# Patient Record
Sex: FEMALE | Race: BLACK OR AFRICAN AMERICAN | NOT HISPANIC OR LATINO | Employment: FULL TIME | URBAN - METROPOLITAN AREA
[De-identification: names, ages, dates, MRNs, and addresses within clinical notes are randomized per-mention and may not be internally consistent; named-entity substitution may affect disease eponyms.]

---

## 2024-01-24 ENCOUNTER — HOSPITAL ENCOUNTER (EMERGENCY)
Facility: HOSPITAL | Age: 29
Discharge: HOME | End: 2024-01-24
Attending: EMERGENCY MEDICINE | Admitting: EMERGENCY MEDICINE

## 2024-01-24 VITALS
OXYGEN SATURATION: 100 % | WEIGHT: 137.35 LBS | HEIGHT: 65 IN | SYSTOLIC BLOOD PRESSURE: 103 MMHG | BODY MASS INDEX: 22.88 KG/M2 | HEART RATE: 87 BPM | RESPIRATION RATE: 17 BRPM | TEMPERATURE: 97.9 F | DIASTOLIC BLOOD PRESSURE: 54 MMHG

## 2024-01-24 DIAGNOSIS — Z33.1 PREGNANCY TEST POSITIVE FOR INCIDENTAL PREGNANCY: Primary | ICD-10-CM

## 2024-01-24 LAB
ALBUMIN SERPL-MCNC: 4 G/DL (ref 3.5–5.2)
ALBUMIN/GLOB SERPL: 1.5 G/DL
ALP SERPL-CCNC: 34 U/L (ref 39–117)
ALT SERPL W P-5'-P-CCNC: 63 U/L (ref 1–33)
ANION GAP SERPL CALCULATED.3IONS-SCNC: 10.9 MMOL/L (ref 5–15)
AST SERPL-CCNC: 31 U/L (ref 1–32)
B-HCG UR QL: POSITIVE
BASOPHILS # BLD AUTO: 0.02 10*3/MM3 (ref 0–0.2)
BASOPHILS NFR BLD AUTO: 0.2 % (ref 0–1.5)
BILIRUB SERPL-MCNC: 0.2 MG/DL (ref 0–1.2)
BILIRUB UR QL STRIP: NEGATIVE
BUN SERPL-MCNC: 4 MG/DL (ref 6–20)
BUN/CREAT SERPL: 8.2 (ref 7–25)
CALCIUM SPEC-SCNC: 8.3 MG/DL (ref 8.6–10.5)
CHLORIDE SERPL-SCNC: 104 MMOL/L (ref 98–107)
CLARITY UR: ABNORMAL
CO2 SERPL-SCNC: 19.1 MMOL/L (ref 22–29)
COLOR UR: YELLOW
CREAT SERPL-MCNC: 0.49 MG/DL (ref 0.57–1)
DEPRECATED RDW RBC AUTO: 38.9 FL (ref 37–54)
EGFRCR SERPLBLD CKD-EPI 2021: 132.7 ML/MIN/1.73
EOSINOPHIL # BLD AUTO: 0.13 10*3/MM3 (ref 0–0.4)
EOSINOPHIL NFR BLD AUTO: 1.5 % (ref 0.3–6.2)
ERYTHROCYTE [DISTWIDTH] IN BLOOD BY AUTOMATED COUNT: 13.1 % (ref 12.3–15.4)
GLOBULIN UR ELPH-MCNC: 2.6 GM/DL
GLUCOSE SERPL-MCNC: 78 MG/DL (ref 65–99)
GLUCOSE UR STRIP-MCNC: NEGATIVE MG/DL
HCG INTACT+B SERPL-ACNC: NORMAL MIU/ML
HCT VFR BLD AUTO: 33.3 % (ref 34–46.6)
HGB BLD-MCNC: 11.1 G/DL (ref 12–15.9)
HGB UR QL STRIP.AUTO: NEGATIVE
HOLD SPECIMEN: NORMAL
HOLD SPECIMEN: NORMAL
IMM GRANULOCYTES # BLD AUTO: 0.03 10*3/MM3 (ref 0–0.05)
IMM GRANULOCYTES NFR BLD AUTO: 0.3 % (ref 0–0.5)
KETONES UR QL STRIP: ABNORMAL
LEUKOCYTE ESTERASE UR QL STRIP.AUTO: NEGATIVE
LIPASE SERPL-CCNC: 12 U/L (ref 13–60)
LYMPHOCYTES # BLD AUTO: 3.18 10*3/MM3 (ref 0.7–3.1)
LYMPHOCYTES NFR BLD AUTO: 37 % (ref 19.6–45.3)
MCH RBC QN AUTO: 27.3 PG (ref 26.6–33)
MCHC RBC AUTO-ENTMCNC: 33.3 G/DL (ref 31.5–35.7)
MCV RBC AUTO: 82 FL (ref 79–97)
MONOCYTES # BLD AUTO: 0.51 10*3/MM3 (ref 0.1–0.9)
MONOCYTES NFR BLD AUTO: 5.9 % (ref 5–12)
NEUTROPHILS NFR BLD AUTO: 4.73 10*3/MM3 (ref 1.7–7)
NEUTROPHILS NFR BLD AUTO: 55.1 % (ref 42.7–76)
NITRITE UR QL STRIP: NEGATIVE
NRBC BLD AUTO-RTO: 0 /100 WBC (ref 0–0.2)
PH UR STRIP.AUTO: 6 [PH] (ref 5–8)
PLATELET # BLD AUTO: 180 10*3/MM3 (ref 140–450)
PMV BLD AUTO: 10.1 FL (ref 6–12)
POTASSIUM SERPL-SCNC: 3.8 MMOL/L (ref 3.5–5.2)
PROT SERPL-MCNC: 6.6 G/DL (ref 6–8.5)
PROT UR QL STRIP: NEGATIVE
RBC # BLD AUTO: 4.06 10*6/MM3 (ref 3.77–5.28)
SODIUM SERPL-SCNC: 134 MMOL/L (ref 136–145)
SP GR UR STRIP: 1.02 (ref 1–1.03)
UROBILINOGEN UR QL STRIP: ABNORMAL
WBC NRBC COR # BLD AUTO: 8.6 10*3/MM3 (ref 3.4–10.8)
WHOLE BLOOD HOLD COAG: NORMAL
WHOLE BLOOD HOLD SPECIMEN: NORMAL

## 2024-01-24 PROCEDURE — 36415 COLL VENOUS BLD VENIPUNCTURE: CPT

## 2024-01-24 PROCEDURE — 85025 COMPLETE CBC W/AUTO DIFF WBC: CPT

## 2024-01-24 PROCEDURE — 81003 URINALYSIS AUTO W/O SCOPE: CPT

## 2024-01-24 PROCEDURE — 80053 COMPREHEN METABOLIC PANEL: CPT

## 2024-01-24 PROCEDURE — 99283 EMERGENCY DEPT VISIT LOW MDM: CPT

## 2024-01-24 PROCEDURE — 25810000003 SODIUM CHLORIDE 0.9 % SOLUTION

## 2024-01-24 PROCEDURE — 81025 URINE PREGNANCY TEST: CPT

## 2024-01-24 PROCEDURE — 83690 ASSAY OF LIPASE: CPT

## 2024-01-24 PROCEDURE — 84702 CHORIONIC GONADOTROPIN TEST: CPT | Performed by: EMERGENCY MEDICINE

## 2024-01-24 RX ORDER — SODIUM CHLORIDE 0.9 % (FLUSH) 0.9 %
10 SYRINGE (ML) INJECTION AS NEEDED
Status: DISCONTINUED | OUTPATIENT
Start: 2024-01-24 | End: 2024-01-24 | Stop reason: HOSPADM

## 2024-01-24 RX ORDER — ACETAMINOPHEN 325 MG/1
650 TABLET ORAL ONCE
Status: COMPLETED | OUTPATIENT
Start: 2024-01-24 | End: 2024-01-24

## 2024-01-24 RX ADMIN — ACETAMINOPHEN 650 MG: 325 TABLET ORAL at 11:14

## 2024-01-24 RX ADMIN — SODIUM CHLORIDE 500 ML: 9 INJECTION, SOLUTION INTRAVENOUS at 11:05

## 2024-01-24 NOTE — Clinical Note
[facility] EMERGENCY ROOM  [address]  [place] [address]  Phone: [phone]    Juanito Newberry was seen and treated in our emergency department on 1/24/2024.  She may return to work on 01/25/2024.         Thank you for choosing [facility].    Stephanie Hurt PA-C

## 2024-01-24 NOTE — ED PROVIDER NOTES
"Time: 10:32 AM EST  Date of encounter:  1/24/2024  Independent Historian/Clinical History and Information was obtained by:   Patient    History is limited by: N/A    Chief Complaint   Patient presents with    Abdominal Pain         History of Present Illness:  Patient is a 28 y.o. year old female who presents to the emergency department for evaluation of periumbilical abdominal cramping and irregular menstrual cycles.  Patient states her LMP was about 2 to 3 months, she states she has always had irregular cycles.  Is not on birth control.  Last bowel movement yesterday.  Patient states she normally has cramping in her lower abdominal area when she is about to start her cycle but never in the middle of her abdomen.  Denies nausea, vomiting, diarrhea, dysuria and hematuria. Denies vaginal bleeding.     Patient Care Team  Primary Care Provider: Provider, No Known    Past Medical History:     No Known Allergies  History reviewed. No pertinent past medical history.  History reviewed. No pertinent surgical history.  History reviewed. No pertinent family history.    Home Medications:  Prior to Admission medications    Not on File        Social History:   Social History     Tobacco Use    Smoking status: Never    Smokeless tobacco: Never   Vaping Use    Vaping Use: Former   Substance Use Topics    Alcohol use: Yes     Comment: \"social drinker\"    Drug use: Not Currently     Types: Marijuana     Comment: last use 2 mo. ago         Review of Systems:  Review of Systems   Constitutional: Negative.    HENT: Negative.     Eyes: Negative.    Respiratory: Negative.     Cardiovascular: Negative.    Gastrointestinal:  Positive for abdominal pain. Negative for diarrhea, nausea and vomiting.   Endocrine: Negative.    Genitourinary: Negative.  Negative for dysuria and hematuria.   Musculoskeletal: Negative.    Skin: Negative.    Allergic/Immunologic: Negative.    Neurological: Negative.    Hematological: Negative.  " "  Psychiatric/Behavioral: Negative.          Physical Exam:  BP 94/61   Pulse 72   Temp 97.9 °F (36.6 °C) (Oral)   Resp 18   Ht 165.1 cm (65\")   Wt 62.3 kg (137 lb 5.6 oz)   LMP  (LMP Unknown) Comment: pt. think her last period was about 2 mo. hasn't been sexually active for 2 mo. doesn't know if she could be pregnant because she hasn't checked  SpO2 99%   BMI 22.86 kg/m²         Physical Exam  Vitals and nursing note reviewed.   Constitutional:       Appearance: Normal appearance.   HENT:      Head: Normocephalic and atraumatic.      Nose: Nose normal.      Mouth/Throat:      Mouth: Mucous membranes are moist.   Eyes:      Extraocular Movements: Extraocular movements intact.      Conjunctiva/sclera: Conjunctivae normal.      Pupils: Pupils are equal, round, and reactive to light.   Cardiovascular:      Rate and Rhythm: Normal rate and regular rhythm.      Heart sounds: Normal heart sounds.   Pulmonary:      Effort: Pulmonary effort is normal.      Breath sounds: Normal breath sounds.   Abdominal:      General: Abdomen is flat. Bowel sounds are normal.      Palpations: Abdomen is soft.      Tenderness: There is no abdominal tenderness. There is no right CVA tenderness, left CVA tenderness, guarding or rebound.   Musculoskeletal:         General: Normal range of motion.      Cervical back: Normal range of motion and neck supple.   Skin:     General: Skin is warm and dry.   Neurological:      General: No focal deficit present.      Mental Status: She is alert and oriented to person, place, and time.   Psychiatric:         Mood and Affect: Mood normal.         Behavior: Behavior normal.                  Procedures:  Procedures      Medical Decision Making:      Comorbidities that affect care:    None    External Notes reviewed:    None      The following orders were placed and all results were independently analyzed by me:  Orders Placed This Encounter   Procedures    [place] Draw    Comprehensive Metabolic " Panel    Lipase    Urinalysis With Microscopic If Indicated (No Culture) - Urine, Clean Catch    hCG, Quantitative, Pregnancy    CBC Auto Differential    Pregnancy, Urine - Urine, Clean Catch    Ambulatory Referral to Obstetrics / Gynecology    NPO Diet NPO Type: Strict NPO    Undress & Gown    Insert Peripheral IV    CBC & Differential    Green Top (Gel)    Lavender Top    Gold Top - SST    Light Blue Top       Medications Given in the Emergency Department:  Medications   sodium chloride 0.9 % flush 10 mL (has no administration in time range)   sodium chloride 0.9 % bolus 500 mL (500 mL Intravenous New Bag 1/24/24 1105)   acetaminophen (TYLENOL) tablet 650 mg (650 mg Oral Given 1/24/24 1114)        ED Course:    The patient was initially evaluated in the triage area where orders were placed. The patient was later dispositioned by Stephanie Hurt PA-C.      The patient was advised to stay for completion of workup which includes but is not limited to communication of labs and radiological results, reassessment and plan. The patient was advised that leaving prior to disposition by a provider could result in critical findings that are not communicated to the patient.          Labs:    Lab Results (last 24 hours)       Procedure Component Value Units Date/Time    Urinalysis With Microscopic If Indicated (No Culture) - Urine, Clean Catch [[id number]]  (Abnormal) Collected: 01/24/24 0927    Specimen: Urine, Clean Catch Updated: 01/24/24 0934     Color, UA Yellow     Appearance, UA Cloudy     pH, UA 6.0     Specific Gravity, UA 1.017     Glucose, UA Negative     Ketones, UA 15 mg/dL (1+)     Bilirubin, UA Negative     Blood, UA Negative     Protein, UA Negative     Leuk Esterase, UA Negative     Nitrite, UA Negative     Urobilinogen, UA 0.2 E.U./dL    Narrative:      Urine microscopic not indicated.    Pregnancy, Urine - Urine, Clean Catch [[id number]]  (Abnormal) Collected: 01/24/24 0927    Specimen: Urine, Clean Catch  Updated: 01/24/24 1042     HCG, Urine QL Positive    Narrative:      Sensitive immunoassays may demonstrate false positive results  with specimens containing heterophilic antibodies. Assays may  also exhibit false-positive or false-negative results with  specimens containing human anti-mouse antibodies. These   specimens may come from patients receving preparations of  mouse monoclonal antibodies for diagnosis or therapy or having  been exposed to mice. If the qualitative interpretation is  inconsistent with the clinical evaluation, results should be   confirmed by an alternate hCG method, ie. quantitative hCG.  As with all urine pregnancy test, this test does not reliably  detect hCG degradation products, including free-beta hCG and  beta core fragments.    CBC & Differential [[id number]]  (Abnormal) Collected: 01/24/24 0928    Specimen: Blood from Arm, Right Updated: 01/24/24 0932    Narrative:      The following orders were created for panel order CBC & Differential.  Procedure                               Abnormality         Status                     ---------                               -----------         ------                     CBC Auto Differential[[id number]]        Abnormal            Final result                 Please view results for these tests on the individual orders.    Comprehensive Metabolic Panel [[id number]]  (Abnormal) Collected: 01/24/24 0928    Specimen: Blood from Arm, Right Updated: 01/24/24 1005     Glucose 78 mg/dL      BUN 4 mg/dL      Creatinine 0.49 mg/dL      Sodium 134 mmol/L      Potassium 3.8 mmol/L      Chloride 104 mmol/L      CO2 19.1 mmol/L      Calcium 8.3 mg/dL      Total Protein 6.6 g/dL      Albumin 4.0 g/dL      ALT (SGPT) 63 U/L      AST (SGOT) 31 U/L      Alkaline Phosphatase 34 U/L      Total Bilirubin 0.2 mg/dL      Globulin 2.6 gm/dL      A/G Ratio 1.5 g/dL      BUN/Creatinine Ratio 8.2     Anion Gap 10.9 mmol/L      eGFR 132.7 mL/min/1.73     Narrative:      GFR  Normal >60  Chronic Kidney Disease <60  Kidney Failure <15      Lipase [[id number]]  (Abnormal) Collected: 01/24/24 0928    Specimen: Blood from Arm, Right Updated: 01/24/24 1005     Lipase 12 U/L     hCG, Quantitative, Pregnancy [[id number]] Collected: 01/24/24 0928    Specimen: Blood from Arm, Right Updated: 01/24/24 1120     HCG Quantitative 41,539.00 mIU/mL     Narrative:      HCG Ranges by Gestational Age    Females - non-pregnant premenopausal   </= 1mIU/mL HCG  Females - postmenopausal               </= 7mIU/mL HCG    3 Weeks       5.4   -      72 mIU/mL  4 Weeks      10.2   -     708 mIU/mL  5 Weeks       217   -   8,245 mIU/mL  6 Weeks       152   -  32,177 mIU/mL  7 Weeks     4,059   - 153,767 mIU/mL  8 Weeks    31,366   - 149,094 mIU/mL  9 Weeks    59,109   - 135,901 mIU/mL  10 Weeks   44,186   - 170,409 mIU/mL  12 Weeks   27,107   - 201,615 mIU/mL  14 Weeks   24,302   -  93,646 mIU/mL  15 Weeks   12,540   -  69,747 mIU/mL  16 Weeks    8,904   -  55,332 mIU/mL  17 Weeks    8,240   -  51,793 mIU/mL  18 Weeks    9,649   -  55,271 mIU/mL      CBC Auto Differential [[id number]]  (Abnormal) Collected: 01/24/24 0928    Specimen: Blood from Arm, Right Updated: 01/24/24 0932     WBC 8.60 10*3/mm3      RBC 4.06 10*6/mm3      Hemoglobin 11.1 g/dL      Hematocrit 33.3 %      MCV 82.0 fL      MCH 27.3 pg      MCHC 33.3 g/dL      RDW 13.1 %      RDW-SD 38.9 fl      MPV 10.1 fL      Platelets 180 10*3/mm3      Neutrophil % 55.1 %      Lymphocyte % 37.0 %      Monocyte % 5.9 %      Eosinophil % 1.5 %      Basophil % 0.2 %      Immature Grans % 0.3 %      Neutrophils, Absolute 4.73 10*3/mm3      Lymphocytes, Absolute 3.18 10*3/mm3      Monocytes, Absolute 0.51 10*3/mm3      Eosinophils, Absolute 0.13 10*3/mm3      Basophils, Absolute 0.02 10*3/mm3      Immature Grans, Absolute 0.03 10*3/mm3      nRBC 0.0 /100 WBC              Imaging:    No Radiology Exams Resulted Within Past 24 Hours      Differential Diagnosis and  Discussion:      Abdominal Pain: Based on the patient's signs and symptoms, I considered abdominal aortic aneurysm, small bowel obstruction, pancreatitis, acute cholecystitis, acute appendecitis, peptic ulcer disease, gastritis, colitis, endocrine disorders, irritable bowel syndrome and other differential diagnosis an etiology of the patient's abdominal pain.    All labs were reviewed and interpreted by me.  All X-rays impressions were independently interpreted by me.    MDM     Amount and/or Complexity of Data Reviewed  Clinical lab tests: reviewed                 Patient Care Considerations:    CT ABDOMEN AND PELVIS: I considered ordering a CT scan of the abdomen and pelvis however patient's pregnancy test was positive      Consultants/Shared Management Plan:    None    Social Determinants of Health:    Patient is independent, reliable, and has access to care.       Disposition and Care Coordination:    Discharged: The patient is suitable and stable for discharge with no need for consideration of admission.    I have explained the patient´s condition, diagnoses and treatment plan based on the information available to me at this time. I have answered questions and addressed any concerns. The patient has a good  understanding of the patient´s diagnosis, condition, and treatment plan as can be expected at this point. The vital signs have been stable. The patient´s condition is stable and appropriate for discharge from the emergency department.      The patient will pursue further outpatient evaluation with the primary care physician or other designated or consulting physician as outlined in the discharge instructions. They are agreeable to this plan of care and follow-up instructions have been explained in detail. The patient has received these instructions in written format and have expressed an understanding of the discharge instructions. The patient is aware that any significant change in condition or worsening of  symptoms should prompt an immediate return to this or the closest emergency department or call to 911.  I have explained discharge medications and the need for follow up with the patient/caretakers. This was also printed in the discharge instructions. Patient was discharged with the following medications and follow up:      Medication List      No changes were made to your prescriptions during this visit.      No follow-up provider specified.     Final diagnoses:   Pregnancy test positive for incidental pregnancy        ED Disposition       ED Disposition   Discharge    Condition   Stable    Comment   --               This medical record created using voice recognition software.             Stephanie Hurt PA-C  01/24/24 1134

## 2024-01-24 NOTE — DISCHARGE INSTRUCTIONS
Your urine and blood pregnancy test came back positive it puts you at around 7 to 8 weeks pregnant  You can start taking prenatals and I put in a referral for OB/GYN you should receive a phone call to schedule follow-up appointment  You can take Tylenol for abdominal pain  And a vaginal bleeding, pelvic pain please return to the ED

## 2024-01-31 ENCOUNTER — INITIAL PRENATAL (OUTPATIENT)
Dept: OBSTETRICS AND GYNECOLOGY | Facility: CLINIC | Age: 29
End: 2024-01-31
Payer: COMMERCIAL

## 2024-01-31 VITALS — WEIGHT: 123 LBS | SYSTOLIC BLOOD PRESSURE: 131 MMHG | BODY MASS INDEX: 20.47 KG/M2 | DIASTOLIC BLOOD PRESSURE: 81 MMHG

## 2024-01-31 DIAGNOSIS — Z34.00 SUPERVISION OF NORMAL FIRST PREGNANCY, ANTEPARTUM: Primary | ICD-10-CM

## 2024-01-31 LAB
ABO GROUP BLD: NORMAL
AMPHET+METHAMPHET UR QL: NEGATIVE
B-HCG UR QL: POSITIVE
BARBITURATES UR QL SCN: NEGATIVE
BASOPHILS # BLD AUTO: 0.02 10*3/MM3 (ref 0–0.2)
BASOPHILS NFR BLD AUTO: 0.3 % (ref 0–1.5)
BENZODIAZ UR QL SCN: NEGATIVE
BLD GP AB SCN SERPL QL: NEGATIVE
CANNABINOIDS SERPL QL: POSITIVE
COCAINE UR QL: NEGATIVE
DEPRECATED RDW RBC AUTO: 37.5 FL (ref 37–54)
EOSINOPHIL # BLD AUTO: 0.08 10*3/MM3 (ref 0–0.4)
EOSINOPHIL NFR BLD AUTO: 1.2 % (ref 0.3–6.2)
ERYTHROCYTE [DISTWIDTH] IN BLOOD BY AUTOMATED COUNT: 13 % (ref 12.3–15.4)
EXPIRATION DATE: ABNORMAL
FENTANYL UR-MCNC: NEGATIVE NG/ML
GLUCOSE UR STRIP-MCNC: NEGATIVE MG/DL
HBV SURFACE AG SERPL QL IA: NORMAL
HCT VFR BLD AUTO: 32.1 % (ref 34–46.6)
HCV AB SER DONR QL: NORMAL
HGB BLD-MCNC: 11.2 G/DL (ref 12–15.9)
IMM GRANULOCYTES # BLD AUTO: 0.02 10*3/MM3 (ref 0–0.05)
IMM GRANULOCYTES NFR BLD AUTO: 0.3 % (ref 0–0.5)
INTERNAL NEGATIVE CONTROL: NEGATIVE
INTERNAL POSITIVE CONTROL: ABNORMAL
LYMPHOCYTES # BLD AUTO: 2.44 10*3/MM3 (ref 0.7–3.1)
LYMPHOCYTES NFR BLD AUTO: 35.7 % (ref 19.6–45.3)
Lab: ABNORMAL
MCH RBC QN AUTO: 28.1 PG (ref 26.6–33)
MCHC RBC AUTO-ENTMCNC: 34.9 G/DL (ref 31.5–35.7)
MCV RBC AUTO: 80.5 FL (ref 79–97)
METHADONE UR QL SCN: NEGATIVE
MONOCYTES # BLD AUTO: 0.49 10*3/MM3 (ref 0.1–0.9)
MONOCYTES NFR BLD AUTO: 7.2 % (ref 5–12)
NEUTROPHILS NFR BLD AUTO: 3.78 10*3/MM3 (ref 1.7–7)
NEUTROPHILS NFR BLD AUTO: 55.3 % (ref 42.7–76)
NRBC BLD AUTO-RTO: 0.1 /100 WBC (ref 0–0.2)
OPIATES UR QL: NEGATIVE
OXYCODONE UR QL SCN: NEGATIVE
PLATELET # BLD AUTO: 188 10*3/MM3 (ref 140–450)
PMV BLD AUTO: 11 FL (ref 6–12)
PROT UR STRIP-MCNC: NEGATIVE MG/DL
RBC # BLD AUTO: 3.99 10*6/MM3 (ref 3.77–5.28)
RH BLD: NEGATIVE
T PALLIDUM IGG SER QL: NORMAL
WBC NRBC COR # BLD AUTO: 6.83 10*3/MM3 (ref 3.4–10.8)

## 2024-01-31 PROCEDURE — 87086 URINE CULTURE/COLONY COUNT: CPT | Performed by: NURSE PRACTITIONER

## 2024-01-31 PROCEDURE — 86901 BLOOD TYPING SEROLOGIC RH(D): CPT | Performed by: NURSE PRACTITIONER

## 2024-01-31 PROCEDURE — 80307 DRUG TEST PRSMV CHEM ANLYZR: CPT | Performed by: NURSE PRACTITIONER

## 2024-01-31 PROCEDURE — 86900 BLOOD TYPING SEROLOGIC ABO: CPT | Performed by: NURSE PRACTITIONER

## 2024-01-31 PROCEDURE — 86850 RBC ANTIBODY SCREEN: CPT | Performed by: NURSE PRACTITIONER

## 2024-01-31 PROCEDURE — 87340 HEPATITIS B SURFACE AG IA: CPT | Performed by: NURSE PRACTITIONER

## 2024-01-31 PROCEDURE — 85025 COMPLETE CBC W/AUTO DIFF WBC: CPT | Performed by: NURSE PRACTITIONER

## 2024-01-31 PROCEDURE — 86803 HEPATITIS C AB TEST: CPT | Performed by: NURSE PRACTITIONER

## 2024-01-31 PROCEDURE — 86780 TREPONEMA PALLIDUM: CPT | Performed by: NURSE PRACTITIONER

## 2024-01-31 NOTE — PROGRESS NOTES
OB Initial Visit    CC- Here for care of current pregnancy, first visit    Subjective:  28 y.o.  presenting for her first obstetrical visit.    LMP: No LMP recorded (lmp unknown). Patient is pregnant.     Pt has no complaints, is doing well    Is unsure when she last had a period, reports typically only has 3-4 cycles a year.  Last intercourse was approximately 10/25/23 and had a positive home pregnancy test around .     Reviewed and updated:  OBHx, GYNHx (STDs), PMHx, Medications, Allergies, PSHx, Social Hx, Preventative Hx (PAP), Hx of abuse/safe environment, Vaccine Hx including hx of chickenpox or vaccine, Genetic Hx (pt, FOB, both families).        Objective:  /81   Wt 55.8 kg (123 lb)   LMP  (LMP Unknown) Comment: pt. think her last period was about 2 mo. hasn't been sexually active for 2 mo. doesn't know if she could be pregnant because she hasn't checked  BMI 20.47 kg/m²      Urine pregnancy test is positive     General- NAD, alert and oriented, appropriate  Psych- Normal mood, good memory  Neck- No masses, no thyroid enlargement  CV- Regular rhythm, no murnurs  Resp- CTA to bases, no wheezes  Abdomen- Soft, non distended, non tender, no masses    Breast left- deferred  Breast right- deferred    External genitalia- Normal, no lesions  Urethra- Normal, no masses, non tender  Vagina- Normal, no discharge  Bladder- Normal, no masses, non tender  Cvx- Normal, no lesions, no discharge, no CMT  Uterus- Normal shape and consistency, non tender, consistent with approximately 16 weeks , FHR 150s .  Brief TAS shows viable IUP, +cardiac motion  Adnexa- Normal, no mass, non tender    Lymphatic- No palpable neck, axillary, or groin nodes  Ext- No edema, no cyanosis    Skin- No lesions, no rashes, no acanthosis nigricans    Assessment and Plan:  Unknown  Diagnoses and all orders for this visit:    1. Supervision of normal first pregnancy, antepartum (Primary)  Overview:  PHAM finalized: 24 per  estimated conception, dating scan ordered    Optional testing NIPS,CF/SMA,AFP:  NIPS pending, considering CF/SMA, AFP    COVID: Recommended 1/31/24  Flu: Recommended 1/31/24  Tdap:  RSV:    Rhogam:  28-32 weeks repeat TPA:  ? Desires Sterilization:    Anatomy US:  FU US:    PROBLEM LIST/PLAN:   Unsure dates - dating scan ordered        Orders:  -     POC Urinalysis Dipstick  -     OB Panel With HIV  -     Urine Culture - Urine, Urine, Clean Catch  -     IGP,CtNgTv,Apt HPV,rfx 16 / 18,45  -     Hemoglobinopathy Fractionation [place]  -     POC Pregnancy, Urine  -     Urine Drug Screen - Urine, Clean Catch  -     [id number] PLUS Core+SCA+ESS - Blood, Arm, Right  -     US Ob 14 + Weeks Single or First Gestation; Future        Genetic Screening:   Considering   CF  NIPS  AFP only    Vaccines:   Recommend FLU vaccine this season, R/B discussed  Recommend COVID vaccine, R/B discussed    Counseling:   Nutrition discussed, calories, activity/exercise in pregnancy  Discussed dietary restrictions/safety food preparation in pregnancy  Reviewed what to expect prenatal visits, office providers (female and male) and covering [facility]/Dr. Ashton  Appropriate trimester precautions provided, N/V, vag bleeding, cramping  Questions answered    Labs:   Prenatal labs, cultures, and PAP performed (prn), NIPS    Return in about 4 weeks (around 2/28/2024) for [facility] Office, OB follow up.      Isaac Lubin, APRN  01/31/2024    [facility] [address]  [facility]  [address] [place] [address]  Dept: [phone]  Dept Fax: [phone]  Loc: [phone]

## 2024-02-01 PROBLEM — O26.899 RH NEGATIVE, ANTEPARTUM: Status: ACTIVE | Noted: 2024-02-01

## 2024-02-01 PROBLEM — Z67.91 RH NEGATIVE, ANTEPARTUM: Status: ACTIVE | Noted: 2024-02-01

## 2024-02-01 LAB — BACTERIA SPEC AEROBE CULT: NO GROWTH

## 2024-02-02 LAB
HGB A MFR BLD ELPH: 96.8 % (ref 96.4–98.8)
HGB A2 MFR BLD ELPH: 3.2 % (ref 1.8–3.2)
HGB F MFR BLD ELPH: 0 % (ref 0–2)
HGB FRACT BLD-IMP: NORMAL
HGB S MFR BLD ELPH: 0 %
RUBV IGG SERPL IA-ACNC: 1.56 INDEX

## 2024-02-06 LAB
5P15 DELETION (CRI-DU-CHAT): NOT DETECTED
C TRACH RRNA CVX QL NAA+PROBE: NEGATIVE
CFDNA.FET/CFDNA.TOTAL SFR FETUS: NORMAL %
CITATION REF LAB TEST: NORMAL
CYTOLOGIST CVX/VAG CYTO: ABNORMAL
CYTOLOGY CVX/VAG DOC CYTO: ABNORMAL
CYTOLOGY CVX/VAG DOC THIN PREP: ABNORMAL
DX ICD CODE: ABNORMAL
DX ICD CODE: ABNORMAL
FET 13+18+21+X+Y ANEUP PLAS.CFDNA: NEGATIVE
FET 1P36 DEL RISK WBC.DNA+CFDNA QL: NOT DETECTED
FET 22Q11.2 DEL RISK WBC.DNA+CFDNA QL: NOT DETECTED
FET CHR 11Q23 DEL PLAS.CFDNA QL: NOT DETECTED
FET CHR 15Q11 DEL PLAS.CFDNA QL: NOT DETECTED
FET CHR 21 TS PLAS.CFDNA QL: NEGATIVE
FET CHR 4P16 DEL PLAS.CFDNA QL: NOT DETECTED
FET CHR 8Q24 DEL PLAS.CFDNA QL: NOT DETECTED
FET MS X RISK WBC.DNA+CFDNA QL: NOT DETECTED
FET SEX PLAS.CFDNA DOSAGE CFDNA: NORMAL
FET TS 13 RISK PLAS.CFDNA QL: NEGATIVE
FET TS 18 RISK WBC.DNA+CFDNA QL: NEGATIVE
FET X + Y ANEUP RISK PLAS.CFDNA SEQ-IMP: NOT DETECTED
GA EST FROM CONCEPTION DATE: NORMAL D
GESTATIONAL AGE > 9:: YES
HIV 1 & 2 AB SER-IMP: ABNORMAL
HPV GENOTYPE REFLEX: ABNORMAL
HPV I/H RISK 4 DNA CVX QL PROBE+SIG AMP: POSITIVE
LAB DIRECTOR NAME PROVIDER: NORMAL
LAB DIRECTOR NAME PROVIDER: NORMAL
LABORATORY COMMENT REPORT: NORMAL
LIMITATIONS OF THE TEST: NORMAL
N GONORRHOEA RRNA CVX QL NAA+PROBE: NEGATIVE
NEGATIVE PREDICTIVE VALUE: NORMAL
NOTE: NORMAL
OTHER STN SPEC: ABNORMAL
PATHOLOGIST CVX/VAG CYTO: ABNORMAL
PERFORMANCE CHARACTERISTICS: NORMAL
POSITIVE PREDICTIVE VALUE: NORMAL
RECOM F/U CVX/VAG CYTO: ABNORMAL
REF LAB TEST METHOD: NORMAL
STAT OF ADQ CVX/VAG CYTO-IMP: ABNORMAL
T VAGINALIS RRNA SPEC QL NAA+PROBE: NEGATIVE
TEST PERFORMANCE INFO SPEC: NORMAL
TRIOSOMY 16: NOT DETECTED
TRISOMY 22: NOT DETECTED

## 2024-02-07 ENCOUNTER — TELEPHONE (OUTPATIENT)
Dept: OBSTETRICS AND GYNECOLOGY | Facility: CLINIC | Age: 29
End: 2024-02-07
Payer: COMMERCIAL

## 2024-02-07 NOTE — TELEPHONE ENCOUNTER
Patient called went over pap smear results ascus + hpv no additional testing at this time will repeat after delivery.

## 2024-02-14 ENCOUNTER — TELEPHONE (OUTPATIENT)
Dept: OBSTETRICS AND GYNECOLOGY | Facility: CLINIC | Age: 29
End: 2024-02-14
Payer: COMMERCIAL

## 2024-02-14 ENCOUNTER — HOSPITAL ENCOUNTER (OUTPATIENT)
Dept: ULTRASOUND IMAGING | Facility: HOSPITAL | Age: 29
Discharge: HOME | End: 2024-02-14
Admitting: NURSE PRACTITIONER
Payer: COMMERCIAL

## 2024-02-14 DIAGNOSIS — Z34.00 SUPERVISION OF NORMAL FIRST PREGNANCY, ANTEPARTUM: ICD-10-CM

## 2024-02-14 DIAGNOSIS — Z34.00 SUPERVISION OF NORMAL FIRST PREGNANCY, ANTEPARTUM: Primary | ICD-10-CM

## 2024-02-14 PROCEDURE — 76805 OB US >/= 14 WKS SNGL FETUS: CPT

## 2024-02-22 ENCOUNTER — REFERRAL TRIAGE (OUTPATIENT)
Dept: LABOR AND DELIVERY | Facility: HOSPITAL | Age: 29
End: 2024-02-22
Payer: COMMERCIAL

## 2024-02-28 ENCOUNTER — HOSPITAL ENCOUNTER (OUTPATIENT)
Dept: ULTRASOUND IMAGING | Facility: HOSPITAL | Age: 29
Discharge: HOME | End: 2024-02-28
Admitting: NURSE PRACTITIONER
Payer: COMMERCIAL

## 2024-02-28 DIAGNOSIS — Z34.00 SUPERVISION OF NORMAL FIRST PREGNANCY, ANTEPARTUM: ICD-10-CM

## 2024-02-28 PROCEDURE — 76811 OB US DETAILED SNGL FETUS: CPT

## 2024-02-29 DIAGNOSIS — Z34.00 SUPERVISION OF NORMAL FIRST PREGNANCY, ANTEPARTUM: Primary | ICD-10-CM

## 2024-03-13 ENCOUNTER — ROUTINE PRENATAL (OUTPATIENT)
Dept: OBSTETRICS AND GYNECOLOGY | Facility: CLINIC | Age: 29
End: 2024-03-13
Payer: COMMERCIAL

## 2024-03-13 ENCOUNTER — HOSPITAL ENCOUNTER (EMERGENCY)
Facility: HOSPITAL | Age: 29
Discharge: HOME | End: 2024-03-13
Attending: EMERGENCY MEDICINE | Admitting: EMERGENCY MEDICINE
Payer: COMMERCIAL

## 2024-03-13 ENCOUNTER — PATIENT OUTREACH (OUTPATIENT)
Dept: LABOR AND DELIVERY | Facility: HOSPITAL | Age: 29
End: 2024-03-13
Payer: COMMERCIAL

## 2024-03-13 ENCOUNTER — APPOINTMENT (OUTPATIENT)
Dept: GENERAL RADIOLOGY | Facility: HOSPITAL | Age: 29
End: 2024-03-13
Payer: COMMERCIAL

## 2024-03-13 VITALS — BODY MASS INDEX: 22.47 KG/M2 | WEIGHT: 135 LBS | SYSTOLIC BLOOD PRESSURE: 126 MMHG | DIASTOLIC BLOOD PRESSURE: 76 MMHG

## 2024-03-13 VITALS
BODY MASS INDEX: 23.52 KG/M2 | RESPIRATION RATE: 16 BRPM | SYSTOLIC BLOOD PRESSURE: 103 MMHG | HEART RATE: 115 BPM | OXYGEN SATURATION: 99 % | TEMPERATURE: 100.2 F | DIASTOLIC BLOOD PRESSURE: 67 MMHG | WEIGHT: 141.31 LBS

## 2024-03-13 DIAGNOSIS — O98.512 COVID-19 AFFECTING PREGNANCY IN SECOND TRIMESTER: ICD-10-CM

## 2024-03-13 DIAGNOSIS — Z34.00 SUPERVISION OF NORMAL FIRST PREGNANCY, ANTEPARTUM: Primary | ICD-10-CM

## 2024-03-13 DIAGNOSIS — Z67.91 RH NEGATIVE, ANTEPARTUM: ICD-10-CM

## 2024-03-13 DIAGNOSIS — O26.899 RH NEGATIVE, ANTEPARTUM: ICD-10-CM

## 2024-03-13 DIAGNOSIS — U07.1 COVID-19 AFFECTING PREGNANCY IN SECOND TRIMESTER: ICD-10-CM

## 2024-03-13 DIAGNOSIS — Z34.92 SECOND TRIMESTER PREGNANCY: Primary | ICD-10-CM

## 2024-03-13 DIAGNOSIS — J11.1 INFLUENZA: ICD-10-CM

## 2024-03-13 LAB
ALBUMIN SERPL-MCNC: 3.5 G/DL (ref 3.5–5.2)
ALBUMIN/GLOB SERPL: 1.3 G/DL
ALP SERPL-CCNC: 45 U/L (ref 39–117)
ALT SERPL W P-5'-P-CCNC: 33 U/L (ref 1–33)
ANION GAP SERPL CALCULATED.3IONS-SCNC: 11.2 MMOL/L (ref 5–15)
ARTERIAL PATENCY WRIST A: POSITIVE
AST SERPL-CCNC: 26 U/L (ref 1–32)
BASE EXCESS BLDA CALC-SCNC: -3.7 MMOL/L (ref -2–2)
BASOPHILS # BLD AUTO: 0.02 10*3/MM3 (ref 0–0.2)
BASOPHILS NFR BLD AUTO: 0.3 % (ref 0–1.5)
BDY SITE: ABNORMAL
BILIRUB SERPL-MCNC: 0.2 MG/DL (ref 0–1.2)
BUN SERPL-MCNC: 3 MG/DL (ref 6–20)
BUN/CREAT SERPL: 6.3 (ref 7–25)
CA-I BLDA-SCNC: 1.1 MMOL/L (ref 1.13–1.32)
CALCIUM SPEC-SCNC: 8 MG/DL (ref 8.6–10.5)
CHLORIDE BLDA-SCNC: 105 MMOL/L (ref 98–106)
CHLORIDE SERPL-SCNC: 101 MMOL/L (ref 98–107)
CO2 SERPL-SCNC: 16.8 MMOL/L (ref 22–29)
COHGB MFR BLD: 0.3 % (ref 0–1.5)
CREAT SERPL-MCNC: 0.48 MG/DL (ref 0.57–1)
D DIMER PPP FEU-MCNC: 1.97 MCGFEU/ML (ref 0–0.5)
DEPRECATED RDW RBC AUTO: 42.5 FL (ref 37–54)
EGFRCR SERPLBLD CKD-EPI 2021: 133.3 ML/MIN/1.73
EOSINOPHIL # BLD AUTO: 0.01 10*3/MM3 (ref 0–0.4)
EOSINOPHIL NFR BLD AUTO: 0.1 % (ref 0.3–6.2)
ERYTHROCYTE [DISTWIDTH] IN BLOOD BY AUTOMATED COUNT: 13.8 % (ref 12.3–15.4)
FHHB: 3 % (ref 0–5)
FLUAV SUBTYP SPEC NAA+PROBE: DETECTED
FLUBV RNA ISLT QL NAA+PROBE: NOT DETECTED
GLOBULIN UR ELPH-MCNC: 2.6 GM/DL
GLUCOSE BLDA-MCNC: 86 MG/DL (ref 65–99)
GLUCOSE SERPL-MCNC: 91 MG/DL (ref 65–99)
HCO3 BLDA-SCNC: 18.4 MMOL/L (ref 22–26)
HCT VFR BLD AUTO: 30.3 % (ref 34–46.6)
HGB BLD-MCNC: 10.2 G/DL (ref 12–15.9)
HGB BLDA-MCNC: 11.3 G/DL (ref 11.7–14.6)
HOLD SPECIMEN: NORMAL
HOLD SPECIMEN: NORMAL
IMM GRANULOCYTES # BLD AUTO: 0.05 10*3/MM3 (ref 0–0.05)
IMM GRANULOCYTES NFR BLD AUTO: 0.7 % (ref 0–0.5)
INHALED O2 CONCENTRATION: 21 %
LACTATE BLDA-SCNC: 0.74 MMOL/L (ref 0.5–2)
LYMPHOCYTES # BLD AUTO: 0.66 10*3/MM3 (ref 0.7–3.1)
LYMPHOCYTES NFR BLD AUTO: 9.2 % (ref 19.6–45.3)
MCH RBC QN AUTO: 28 PG (ref 26.6–33)
MCHC RBC AUTO-ENTMCNC: 33.7 G/DL (ref 31.5–35.7)
MCV RBC AUTO: 83.2 FL (ref 79–97)
METHGB BLD QL: 0.2 % (ref 0–1.5)
MODALITY: ABNORMAL
MONOCYTES # BLD AUTO: 0.58 10*3/MM3 (ref 0.1–0.9)
MONOCYTES NFR BLD AUTO: 8.1 % (ref 5–12)
NEUTROPHILS NFR BLD AUTO: 5.87 10*3/MM3 (ref 1.7–7)
NEUTROPHILS NFR BLD AUTO: 81.6 % (ref 42.7–76)
NOTE: ABNORMAL
NRBC BLD AUTO-RTO: 0 /100 WBC (ref 0–0.2)
NT-PROBNP SERPL-MCNC: 68.2 PG/ML (ref 0–450)
OXYHGB MFR BLDV: 96.5 % (ref 94–99)
PCO2 BLDA: 25.1 MM HG (ref 35–45)
PH BLDA: 7.48 PH UNITS (ref 7.35–7.45)
PLATELET # BLD AUTO: 177 10*3/MM3 (ref 140–450)
PMV BLD AUTO: 10.3 FL (ref 6–12)
PO2 BLD: 452 MM[HG] (ref 0–500)
PO2 BLDA: 95 MM HG (ref 80–100)
POTASSIUM BLDA-SCNC: 3.47 MMOL/L (ref 3.5–5)
POTASSIUM SERPL-SCNC: 3.6 MMOL/L (ref 3.5–5.2)
PROT SERPL-MCNC: 6.1 G/DL (ref 6–8.5)
QT INTERVAL: 301 MS
QTC INTERVAL: 436 MS
RBC # BLD AUTO: 3.64 10*6/MM3 (ref 3.77–5.28)
RSV RNA NPH QL NAA+NON-PROBE: NOT DETECTED
S PYO AG THROAT QL: NEGATIVE
SAO2 % BLDCOA: 97 % (ref 95–99)
SARS-COV-2 RNA RESP QL NAA+PROBE: NOT DETECTED
SODIUM BLDA-SCNC: 130.5 MMOL/L (ref 136–146)
SODIUM SERPL-SCNC: 129 MMOL/L (ref 136–145)
TROPONIN T SERPL HS-MCNC: <6 NG/L
WBC NRBC COR # BLD AUTO: 7.19 10*3/MM3 (ref 3.4–10.8)
WHOLE BLOOD HOLD COAG: NORMAL
WHOLE BLOOD HOLD SPECIMEN: NORMAL

## 2024-03-13 PROCEDURE — 84484 ASSAY OF TROPONIN QUANT: CPT

## 2024-03-13 PROCEDURE — 82805 BLOOD GASES W/O2 SATURATION: CPT | Performed by: EMERGENCY MEDICINE

## 2024-03-13 PROCEDURE — 85025 COMPLETE CBC W/AUTO DIFF WBC: CPT

## 2024-03-13 PROCEDURE — 99284 EMERGENCY DEPT VISIT MOD MDM: CPT

## 2024-03-13 PROCEDURE — 80053 COMPREHEN METABOLIC PANEL: CPT

## 2024-03-13 PROCEDURE — 93005 ELECTROCARDIOGRAM TRACING: CPT

## 2024-03-13 PROCEDURE — 87880 STREP A ASSAY W/OPTIC: CPT | Performed by: EMERGENCY MEDICINE

## 2024-03-13 PROCEDURE — 82375 ASSAY CARBOXYHB QUANT: CPT | Performed by: EMERGENCY MEDICINE

## 2024-03-13 PROCEDURE — 25810000003 SODIUM CHLORIDE 0.9 % SOLUTION: Performed by: EMERGENCY MEDICINE

## 2024-03-13 PROCEDURE — 87081 CULTURE SCREEN ONLY: CPT | Performed by: EMERGENCY MEDICINE

## 2024-03-13 PROCEDURE — 83050 HGB METHEMOGLOBIN QUAN: CPT | Performed by: EMERGENCY MEDICINE

## 2024-03-13 PROCEDURE — 87637 SARSCOV2&INF A&B&RSV AMP PRB: CPT | Performed by: EMERGENCY MEDICINE

## 2024-03-13 PROCEDURE — 85379 FIBRIN DEGRADATION QUANT: CPT | Performed by: EMERGENCY MEDICINE

## 2024-03-13 PROCEDURE — 83880 ASSAY OF NATRIURETIC PEPTIDE: CPT

## 2024-03-13 PROCEDURE — 93005 ELECTROCARDIOGRAM TRACING: CPT | Performed by: EMERGENCY MEDICINE

## 2024-03-13 PROCEDURE — 71045 X-RAY EXAM CHEST 1 VIEW: CPT

## 2024-03-13 RX ORDER — ASPIRIN 81 MG/1
81 TABLET ORAL DAILY
Qty: 90 TABLET | Refills: 3 | Status: SHIPPED | OUTPATIENT
Start: 2024-03-13

## 2024-03-13 RX ORDER — SODIUM CHLORIDE 0.9 % (FLUSH) 0.9 %
10 SYRINGE (ML) INJECTION AS NEEDED
Status: DISCONTINUED | OUTPATIENT
Start: 2024-03-13 | End: 2024-03-14 | Stop reason: HOSPADM

## 2024-03-13 RX ORDER — OSELTAMIVIR PHOSPHATE 75 MG/1
75 CAPSULE ORAL 2 TIMES DAILY
Qty: 10 CAPSULE | Refills: 0 | Status: SHIPPED | OUTPATIENT
Start: 2024-03-13

## 2024-03-13 RX ORDER — ACETAMINOPHEN 325 MG/1
975 TABLET ORAL ONCE
Status: COMPLETED | OUTPATIENT
Start: 2024-03-13 | End: 2024-03-13

## 2024-03-13 RX ADMIN — SODIUM CHLORIDE 500 ML: 9 INJECTION, SOLUTION INTRAVENOUS at 20:53

## 2024-03-13 RX ADMIN — ACETAMINOPHEN 975 MG: 325 TABLET ORAL at 20:52

## 2024-03-13 NOTE — PROGRESS NOTES
OB FOLLOW UP    CC: Scheduled OB routine FU     Prenatal care complicated by:   Patient Active Problem List   Diagnosis    Supervision of normal first pregnancy, antepartum    Rh negative, antepartum    COVID-19 affecting pregnancy in second trimester       Subjective:   Patient has: No leaking fluid, No vaginal bleeding, No contractions, Adequate FM  Reports she was diagnosed with COVID about a week ago.  Feeling much better now but still having some nasal congestion.  Also concerned about multiple nosebleeds particularly at work.    Objective:  Urine glucose/protein- see flow sheet      /76   Wt 61.2 kg (135 lb)   LMP  (LMP Unknown) Comment: pt. think her last period was about 2 mo. hasn't been sexually active for 2 mo. doesn't know if she could be pregnant because she hasn't checked  BMI 22.47 kg/m²   See OB flow for LE edema, and cvx exam if applicable  FHT: 161 BPM   Uterine Size:  22 cm      Assessment and Plan:  Diagnoses and all orders for this visit:    1. Supervision of normal first pregnancy, antepartum (Primary)  Overview:  PHAM finalized: 7/17/24 per estimated conception, confirmed by 18-week US    Optional testing NIPS,CF/SMA,AFP:  NIPS pending, considering CF/SMA, AFP    COVID: Recommended 1/31/24  Flu: Recommended 1/31/24  Tdap:    Anatomy US: 2/28/24 normal anatomy except for limited views of cranial structures due to positioning, posterior placenta,  g, 76%, AC 53%.  Follow up ordered     Assessment & Plan:  Reviewed the patient's dating and anatomy ultrasound.  EDC finalized.  Continue prenatal vitamin  Follow-up anatomy ultrasound ordered    Orders:  -     Cancel: POC Urinalysis Dipstick    2. Rh negative, antepartum  Overview:  Plan RhoGAM at 28 weeks      3. COVID-19 affecting pregnancy in second trimester  Assessment & Plan:  Nosebleeds likely due to increased nasal congestion.  Recommended a humidifier while sleeping and frequent use of saline nasal spray.    Orders:  -      aspirin 81 MG EC tablet; Take 1 tablet by mouth Daily.  Dispense: 90 tablet; Refill: 3          22w0d  Reassuring pregnancy progress    Counseling: OB precautions, leaking, VB, nina coker vs PTL/Labor  [facility]    Questions answered    Return in about 4 weeks (around 4/10/2024) for Recheck.      Lee Solorzano MD  03/13/2024

## 2024-03-13 NOTE — ASSESSMENT & PLAN NOTE
Nosebleeds likely due to increased nasal congestion.  Recommended a humidifier while sleeping and frequent use of saline nasal spray.

## 2024-03-13 NOTE — ASSESSMENT & PLAN NOTE
Reviewed the patient's dating and anatomy ultrasound.  EDC finalized.  Continue prenatal vitamin  Follow-up anatomy ultrasound ordered

## 2024-03-13 NOTE — OUTREACH NOTE
[facility]  Enrollment    Current Estimated Gestational Age: 22w0d    Questions/Answers      Flowsheet Row Responses   Would like to participate? Yes          Intake Assessment      Flowsheet Row Responses   Best Method for Contacting Cell   Currently Employed Yes  [[employer]]   Able to keep appointments as scheduled Yes   Gender(s) and Name(s) Girl - Leonardia   Resources Presently Utilizing: None   Maternal Warning Signs Provided   Other: Provided   Other Education HANDS, WIC Benefits, Insurance benefits/Incentives            Pediatrician:   FOB: not involved - sister is support ([place])  Feeding Plan: breast feeding, discussed ordering through insurance or getting from hospital    No current concerns with food, housing or transportation.  Encouraged to reach out for any questions, needs or concerns.      Tobacco, Alcohol, and Drug History     reports that she has never smoked. She has been exposed to tobacco smoke. She has never used smokeless tobacco.   reports that she does not currently use alcohol.   reports that she does not currently use drugs after having used the following drugs: Marijuana.    Carmen Chu RN  Maternity Nurse Navigator    3/13/2024, 13:40 EDT

## 2024-03-14 NOTE — DISCHARGE INSTRUCTIONS
Drink plenty of fluids.  Take Tylenol as needed for fever.  Take Tamiflu as directed.  Turn for worsening symptoms.  Follow-up with your OB/GYN this week.

## 2024-03-14 NOTE — ED PROVIDER NOTES
Time: 8:23 PM EDT  Date of encounter:  3/13/2024  Independent Historian/Clinical History and Information was obtained by:   Patient    History is limited by: N/A    Chief Complaint: Cough, fever, back pain, shortness of breath.      History of Present Illness:  Patient is a 28 y.o. year old female who presents to the emergency department for evaluation of cough, fever, back pain, shortness of breath.  This patient presents to the emergency department send that she is approximate 5 and half weeks pregnant.  She states that she was diagnosed with COVID a couple of weeks ago and today presents with cough, weakness, shortness of breath and low-grade fever.  The cough is primarily nonproductive.  The patient denies any abdominal pain he denies any vaginal bleeding or discharge.  She has had excellent fetal movement.    HPI    Patient Care Team  Primary Care Provider: Provider, Gabby Known    Past Medical History:     No Known Allergies  History reviewed. No pertinent past medical history.  History reviewed. No pertinent surgical history.  Family History   Problem Relation Age of Onset    Diabetes Paternal Grandmother     Depression Neg Hx     COPD Neg Hx     Cancer Neg Hx     Bleeding Disorder Neg Hx     Birth defects Neg Hx     Asthma Neg Hx     Arthritis Neg Hx     Alcohol abuse Neg Hx     Drug abuse Neg Hx     Early death Neg Hx     Hearing loss Neg Hx     Heart disease Neg Hx     Hyperlipidemia Neg Hx     Hypertension Neg Hx     Kidney disease Neg Hx     Learning disabilities Neg Hx     Malig Hyperthermia Neg Hx     Mental illness Neg Hx     Mental retardation Neg Hx     Miscarriages / Stillbirths Neg Hx        Home Medications:  Prior to Admission medications    Medication Sig Start Date End Date Taking? Authorizing Provider   aspirin 81 MG EC tablet Take 1 tablet by mouth Daily. 3/13/24   Lee Solorzano MD   prenatal vitamin (prenatal, CLASSIC, vitamin) tablet Take  by mouth Daily.    Provider, MD Yemi  "       Social History:   Social History     Tobacco Use    Smoking status: Never     Passive exposure: Current    Smokeless tobacco: Never   Vaping Use    Vaping status: Former   Substance Use Topics    Alcohol use: Not Currently     Comment: \"social drinker\"    Drug use: Not Currently     Types: Marijuana     Comment: last use 2 mo. ago         Review of Systems:  Review of Systems   Constitutional:  Negative for chills and fever.   HENT:  Negative for congestion, ear pain and sore throat.    Eyes:  Negative for pain.   Respiratory:  Positive for cough, chest tightness and shortness of breath.    Cardiovascular:  Negative for chest pain.   Gastrointestinal:  Negative for abdominal pain, diarrhea, nausea and vomiting.   Genitourinary:  Negative for flank pain and hematuria.   Musculoskeletal:  Negative for joint swelling.   Skin:  Negative for pallor.   Neurological:  Negative for seizures and headaches.   All other systems reviewed and are negative.       Physical Exam:  /67   Pulse 115   Temp 100.2 °F (37.9 °C)   Resp 16   Wt 64.1 kg (141 lb 5 oz)   LMP  (LMP Unknown) Comment: pt. think her last period was about 2 mo. hasn't been sexually active for 2 mo. doesn't know if she could be pregnant because she hasn't checked  SpO2 99%   BMI 23.52 kg/m²     Physical Exam  Vitals and nursing note reviewed.   Constitutional:       General: She is not in acute distress.     Appearance: Normal appearance. She is not toxic-appearing.   HENT:      Head: Normocephalic and atraumatic.      Mouth/Throat:      Mouth: Mucous membranes are moist.   Eyes:      General: No scleral icterus.  Cardiovascular:      Rate and Rhythm: Regular rhythm. Tachycardia present.      Pulses: Normal pulses.      Heart sounds: Normal heart sounds.   Pulmonary:      Effort: Pulmonary effort is normal. No respiratory distress.      Breath sounds: Normal breath sounds.   Abdominal:      General: Abdomen is flat.      Palpations: Abdomen is " soft.      Tenderness: There is no abdominal tenderness.   Musculoskeletal:         General: Normal range of motion.      Cervical back: Normal range of motion and neck supple.   Skin:     General: Skin is warm and dry.   Neurological:      Mental Status: She is alert and oriented to person, place, and time. Mental status is at baseline.                  Procedures:  Procedures      Medical Decision Making:      Comorbidities that affect care:    Pregnancy    External Notes reviewed:    Previous Clinic Note: Diagnosis of COVID at urgent care on March 3.      The following orders were placed and all results were independently analyzed by me:  Orders Placed This Encounter   Procedures    COVID-19, FLU A/B, RSV PCR 1 HR TAT - Swab, Nasopharynx    Rapid Strep A Screen - Swab, Throat    Beta Strep Culture, Throat - Swab, Throat    XR Chest 1 View    [place] Draw    Comprehensive Metabolic Panel    BNP    Single High Sensitivity Troponin T    CBC Auto Differential    ABG with Co-Ox and Electrolytes    D-dimer, Quantitative    Undress & Gown    Continuous Pulse Oximetry    Vital Signs    Monitor fetal heart tones    ECG 12 Lead ED Triage Standing Order; SOA    CBC & Differential    Green Top (Gel)    Lavender Top    Gold Top - SST    Light Blue Top       Medications Given in the Emergency Department:  Medications   acetaminophen (TYLENOL) tablet 975 mg (975 mg Oral Given 3/13/24 2052)   sodium chloride 0.9 % bolus 500 mL (0 mL Intravenous Stopped 3/13/24 2146)        ED Course:    ED Course as of 03/14/24 0323   Wed Mar 13, 2024   2052 Ionized Calcium(!): 1.10 [PP]      ED Course User Index  [PP] Mayito Mccall,      EKG: Sinus tachycardia the rate of 126 bpm  No acute ischemic changes.    Labs:    Lab Results (last 24 hours)       Procedure Component Value Units Date/Time    CBC & Differential [[id number]]  (Abnormal) Collected: 03/13/24 1749    Specimen: Blood Updated: 03/13/24 1757    Narrative:      The following  orders were created for panel order CBC & Differential.  Procedure                               Abnormality         Status                     ---------                               -----------         ------                     CBC Auto Differential[[id number]]        Abnormal            Final result                 Please view results for these tests on the individual orders.    Comprehensive Metabolic Panel [[id number]]  (Abnormal) Collected: 03/13/24 1749    Specimen: Blood Updated: 03/13/24 1822     Glucose 91 mg/dL      BUN 3 mg/dL      Creatinine 0.48 mg/dL      Sodium 129 mmol/L      Potassium 3.6 mmol/L      Chloride 101 mmol/L      CO2 16.8 mmol/L      Calcium 8.0 mg/dL      Total Protein 6.1 g/dL      Albumin 3.5 g/dL      ALT (SGPT) 33 U/L      AST (SGOT) 26 U/L      Alkaline Phosphatase 45 U/L      Total Bilirubin 0.2 mg/dL      Globulin 2.6 gm/dL      A/G Ratio 1.3 g/dL      BUN/Creatinine Ratio 6.3     Anion Gap 11.2 mmol/L      eGFR 133.3 mL/min/1.73     Narrative:      GFR Normal >60  Chronic Kidney Disease <60  Kidney Failure <15      BNP [[id number]]  (Normal) Collected: 03/13/24 1749    Specimen: Blood Updated: 03/13/24 1820     proBNP 68.2 pg/mL     Narrative:      This assay is used as an aid in the diagnosis of individuals suspected of having heart failure. It can be used as an aid in the diagnosis of acute decompensated heart failure (ADHF) in patients presenting with signs and symptoms of ADHF to the emergency department (ED). In addition, NT-proBNP of <300 pg/mL indicates ADHF is not likely.    Age Range Result Interpretation  NT-proBNP Concentration (pg/mL:      <50             Positive            >450                   Gray                 300-450                    Negative             <300    50-75           Positive            >900                  Gray                300-900                  Negative            <300      >75             Positive            >1800                  Dowd                 300-1800                  Negative            <300    Single High Sensitivity Troponin T [[id number]]  (Normal) Collected: 03/13/24 1749    Specimen: Blood Updated: 03/13/24 1822     HS Troponin T <6 ng/L     Narrative:      High Sensitive Troponin T Reference Range:  <14.0 ng/L- Negative Female for AMI  <22.0 ng/L- Negative Male for AMI  >=14 - Abnormal Female indicating possible myocardial injury.  >=22 - Abnormal Male indicating possible myocardial injury.   Clinicians would have to utilize clinical acumen, EKG, Troponin, and serial changes to determine if it is an Acute Myocardial Infarction or myocardial injury due to an underlying chronic condition.         CBC Auto Differential [[id number]]  (Abnormal) Collected: 03/13/24 1749    Specimen: Blood Updated: 03/13/24 1757     WBC 7.19 10*3/mm3      RBC 3.64 10*6/mm3      Hemoglobin 10.2 g/dL      Hematocrit 30.3 %      MCV 83.2 fL      MCH 28.0 pg      MCHC 33.7 g/dL      RDW 13.8 %      RDW-SD 42.5 fl      MPV 10.3 fL      Platelets 177 10*3/mm3      Neutrophil % 81.6 %      Lymphocyte % 9.2 %      Monocyte % 8.1 %      Eosinophil % 0.1 %      Basophil % 0.3 %      Immature Grans % 0.7 %      Neutrophils, Absolute 5.87 10*3/mm3      Lymphocytes, Absolute 0.66 10*3/mm3      Monocytes, Absolute 0.58 10*3/mm3      Eosinophils, Absolute 0.01 10*3/mm3      Basophils, Absolute 0.02 10*3/mm3      Immature Grans, Absolute 0.05 10*3/mm3      nRBC 0.0 /100 WBC     ABG with Co-Ox and Electrolytes [[id number]]  (Abnormal) Collected: 03/13/24 2033    Specimen: Arterial Blood Updated: 03/13/24 2035     pH, Arterial 7.483 pH units      pCO2, Arterial 25.1 mm Hg      pO2, Arterial 95.0 mm Hg      HCO3, Arterial 18.4 mmol/L      Base Excess, Arterial -3.7 mmol/L      O2 Saturation, Arterial 97.0 %      Hemoglobin, Blood Gas 11.3 g/dL      Carboxyhemoglobin 0.3 %      Methemoglobin 0.20 %      Oxyhemoglobin 96.5 %      FHHB 3.0 %      Raimundo's Test Positive     Note  patient not hypoxic nor having trouble picking up O2 saturation. VBG applicable over ABG since patient has line present.     Site Arterial: right radial     Modality Room Air     FIO2 21 %      Sodium, Arterial 130.5 mmol/L      Potassium, Arterial 3.47 mmol/L      Ionized Calcium, Arterial 1.10 mmol/L      Chloride, Arterial 105 mmol/L      Glucose, Arterial 86 mg/dL      Lactate, Arterial 0.74 mmol/L      PO2/FIO2 452    COVID-19, FLU A/B, RSV PCR 1 HR TAT - Swab, Nasopharynx [[id number]]  (Abnormal) Collected: 03/13/24 2101    Specimen: Swab from Nasopharynx Updated: 03/13/24 2142     COVID19 Not Detected     Influenza A PCR Detected     Influenza B PCR Not Detected     RSV, PCR Not Detected    Narrative:      Fact sheet for providers: https://www.fda.gov/media/495602/download    Fact sheet for patients: https://www.fda.gov/media/586419/download    Test performed by PCR.    Rapid Strep A Screen - Swab, Throat [[id number]]  (Normal) Collected: 03/13/24 2102    Specimen: Swab from Throat Updated: 03/13/24 2146     Strep A Ag Negative    Beta Strep Culture, Throat - Swab, Throat [[id number]] Collected: 03/13/24 2102    Specimen: Swab from Throat Updated: 03/13/24 2145    D-dimer, Quantitative [[id number]]  (Abnormal) Collected: 03/13/24 2156    Specimen: Blood Updated: 03/13/24 2216     D-Dimer, Quantitative 1.97 MCGFEU/mL     Narrative:      According to the assay 's published package insert, a normal (<0.50 MCGFEU/mL) D-dimer result in conjunction with a non-high clinical probability assessment, excludes deep vein thrombosis (DVT) and pulmonary embolism (PE) with high sensitivity.    D-dimer values increase with age and this can make VTE exclusion of an older population difficult. To address this, the [employer] College [employer], based on best available evidence and recent guidelines, recommends that clinicians use age-adjusted D-dimer thresholds in patients greater than 50 years of age with: a) a low  "probability of PE who do not meet all Pulmonary Embolism Rule Out Criteria, or b) in those with intermediate probability of PE.   The formula for an age-adjusted D-dimer cut-off is \"age/100\".  For example, a 60 year old patient would have an age-adjusted cut-off of 0.60 MCGFEU/mL and an 80 year old 0.80 MCGFEU/mL.             Imaging:    XR Chest 1 View    Result Date: 3/13/2024  PROCEDURE: XR CHEST 1 VW  COMPARISON: [facility], , CHEST PA/AP & LAT 2V, 9/12/2009, 16:32.  INDICATIONS: SOA Triage Protocol short of breath and chest tighness  FINDINGS:  The lungs are clear bilaterally.  The cardiac and mediastinal silhouettes appear normal.  No effusion is seen.        1. No acute cardiopulmonary disease.       Austin Guerrero M.D.       Electronically Signed and Approved By: Austin Guerrero M.D. on 3/13/2024 at 18:52                Differential Diagnosis and Discussion:    Cough: Differential diagnosis includes but is not limited to pneumonia, acute bronchitis, upper respiratory infection, ACE inhibitor use, allergic reaction, epiglottitis, seasonal allergies, chemical irritants, exercise-induced asthma, viral syndrome.  Dyspnea: Differential diagnosis includes but is not limited to metabolic acidosis, neurological disorders, psychogenic, asthma, pneumothorax, upper airway obstruction, COPD, pneumonia, noncardiogenic pulmonary edema, interstitial lung disease, anemia, congestive heart failure, and pulmonary embolism    All labs were reviewed and interpreted by me.  EKG was interpreted by me.    MDM     Amount and/or Complexity of Data Reviewed  Clinical lab tests: reviewed  Tests in the radiology section of CPT®: reviewed  Tests in the medicine section of CPT®: reviewed  Decide to obtain previous medical records or to obtain history from someone other than the patient: yes                 Patient Care Considerations:    CT CHEST: I considered ordering a CT scan of the chest, however after a discussion with " Dr. Coppola who is the hospitalist for OB/GYN and with the patient, both felt that she did not need a CT of the chest even though her D-dimer was elevated.  It was felt that the patient has influenza and this is the cause of her symptoms.  She is improved after Tylenol and fluids in the emergency department.  Her pulse oximeter is 99 to 100% on room air.      Consultants/Shared Management Plan:    Consultant: I have discussed the case with the OB hospitalist-Dr. Cuello who states the patient can be discharged home and treated for influenza.    Social Determinants of Health:    Patient is independent, reliable, and has access to care.       Disposition and Care Coordination:    Discharged: The patient is suitable and stable for discharge with no need for consideration of admission.    I have explained discharge medications and the need for follow up with the patient/caretakers. This was also printed in the discharge instructions. Patient was discharged with the following medications and follow up:      Medication List        New Prescriptions      oseltamivir 75 MG capsule  Commonly known as: TAMIFLU  Take 1 capsule by mouth 2 (Two) Times a Day.               Where to Get Your Medications        These medications were sent to [id number] - [place], [place] - [address] AT [facility] - [phone]  - [phone]   [address] [place] [address]      Phone: [phone]   oseltamivir 75 MG capsule      Lee Solorzano MD  [address] [place] [address] [address] [place] [id number] [phone]    In 1 day  Call for appointment       Final diagnoses:   Second trimester pregnancy   Influenza        ED Disposition       ED Disposition   Discharge    Condition   Stable    Comment   --               This medical record created using voice recognition software.             Mayito Mccall, DO  03/14/24 0323

## 2024-03-15 LAB — BACTERIA SPEC AEROBE CULT: NORMAL

## 2024-03-21 LAB
QT INTERVAL: 301 MS
QTC INTERVAL: 436 MS

## 2024-03-26 ENCOUNTER — TELEPHONE (OUTPATIENT)
Dept: OBSTETRICS AND GYNECOLOGY | Facility: CLINIC | Age: 29
End: 2024-03-26
Payer: COMMERCIAL

## 2024-03-29 ENCOUNTER — HOSPITAL ENCOUNTER (OUTPATIENT)
Dept: ULTRASOUND IMAGING | Facility: HOSPITAL | Age: 29
Discharge: HOME | End: 2024-03-29
Payer: COMMERCIAL

## 2024-03-29 DIAGNOSIS — Z34.00 SUPERVISION OF NORMAL FIRST PREGNANCY, ANTEPARTUM: ICD-10-CM

## 2024-03-29 PROCEDURE — 76805 OB US >/= 14 WKS SNGL FETUS: CPT

## 2024-04-01 DIAGNOSIS — Z34.00 SUPERVISION OF NORMAL FIRST PREGNANCY, ANTEPARTUM: Primary | ICD-10-CM

## 2024-04-03 ENCOUNTER — TELEPHONE (OUTPATIENT)
Dept: OBSTETRICS AND GYNECOLOGY | Facility: CLINIC | Age: 29
End: 2024-04-03
Payer: COMMERCIAL

## 2024-04-03 NOTE — TELEPHONE ENCOUNTER
----- Message from NICOLE Buchanan sent at 4/1/2024  1:36 PM EDT -----  Please let patient know her follow up ultrasound demonstrates normal anatomy.  Estimated fetal weight is 607 g, placing baby at the 14%.  Will order follow up ultrasound to double check growth with her regular appointment scheduled for next week.  Will reach out if day/time changes.

## 2024-04-03 NOTE — TELEPHONE ENCOUNTER
Informed patient that her follow up ultrasound demonstrates normal anatomy.  Estimated fetal weight is 607 g, placing baby at the 14%.  Will order follow up ultrasound to double check growth with her regular appointment scheduled for next week.  She is aware that we will reach out if day/time changes.

## 2024-04-08 ENCOUNTER — PATIENT OUTREACH (OUTPATIENT)
Dept: LABOR AND DELIVERY | Facility: HOSPITAL | Age: 29
End: 2024-04-08
Payer: COMMERCIAL

## 2024-04-08 NOTE — OUTREACH NOTE
[facility]  Check-In    Current Estimated Gestational Age: 25w5d      MyChart second trimester information sent.      Carmen Chu RN  Maternity Nurse Navigator    4/8/2024, 10:32 EDT

## 2024-04-11 ENCOUNTER — TELEPHONE (OUTPATIENT)
Dept: OBSTETRICS AND GYNECOLOGY | Facility: CLINIC | Age: 29
End: 2024-04-11
Payer: COMMERCIAL

## 2024-04-11 ENCOUNTER — ROUTINE PRENATAL (OUTPATIENT)
Dept: OBSTETRICS AND GYNECOLOGY | Facility: CLINIC | Age: 29
End: 2024-04-11
Payer: COMMERCIAL

## 2024-04-11 VITALS — SYSTOLIC BLOOD PRESSURE: 131 MMHG | WEIGHT: 144 LBS | BODY MASS INDEX: 23.96 KG/M2 | DIASTOLIC BLOOD PRESSURE: 82 MMHG

## 2024-04-11 DIAGNOSIS — O26.899 RH NEGATIVE, ANTEPARTUM: Primary | ICD-10-CM

## 2024-04-11 DIAGNOSIS — Z67.91 RH NEGATIVE, ANTEPARTUM: Primary | ICD-10-CM

## 2024-04-11 DIAGNOSIS — Z34.00 SUPERVISION OF NORMAL FIRST PREGNANCY, ANTEPARTUM: ICD-10-CM

## 2024-04-11 LAB
DEPRECATED RDW RBC AUTO: 38.6 FL (ref 37–54)
ERYTHROCYTE [DISTWIDTH] IN BLOOD BY AUTOMATED COUNT: 12.7 % (ref 12.3–15.4)
GLUCOSE 1H P GLC SERPL-MCNC: 88 MG/DL (ref 65–139)
GLUCOSE UR STRIP-MCNC: NEGATIVE MG/DL
HCT VFR BLD AUTO: 32.1 % (ref 34–46.6)
HGB BLD-MCNC: 10.9 G/DL (ref 12–15.9)
MCH RBC QN AUTO: 28.5 PG (ref 26.6–33)
MCHC RBC AUTO-ENTMCNC: 34 G/DL (ref 31.5–35.7)
MCV RBC AUTO: 83.8 FL (ref 79–97)
PLATELET # BLD AUTO: 205 10*3/MM3 (ref 140–450)
PMV BLD AUTO: 10.8 FL (ref 6–12)
PROT UR STRIP-MCNC: NEGATIVE MG/DL
RBC # BLD AUTO: 3.83 10*6/MM3 (ref 3.77–5.28)
WBC NRBC COR # BLD AUTO: 8.13 10*3/MM3 (ref 3.4–10.8)

## 2024-04-11 PROCEDURE — 85027 COMPLETE CBC AUTOMATED: CPT | Performed by: STUDENT IN AN ORGANIZED HEALTH CARE EDUCATION/TRAINING PROGRAM

## 2024-04-11 PROCEDURE — 82950 GLUCOSE TEST: CPT | Performed by: STUDENT IN AN ORGANIZED HEALTH CARE EDUCATION/TRAINING PROGRAM

## 2024-04-11 NOTE — PROGRESS NOTES
OB FOLLOW UP  Complaint   Chief Complaint   Patient presents with    Routine Prenatal Visit            Juanito Newberry is a 28 y.o.  26w1d patient being seen today for her obstetrical follow up visit. Patient denies decreased fetal movement, contractions, loss of fluid or vaginal bleeding.  Patient has a history of COVID and influenza.  She denies any respiratory concerns.  Was treated with Paxlovid and Tamiflu.  She was started on a baby aspirin however has discontinued.  She thought this was for nosebleed management.  No other acute complaints.    Her prenatal care is complicated by (and status) :    Patient Active Problem List   Diagnosis    Supervision of normal first pregnancy, antepartum    Rh negative, antepartum    COVID-19 affecting pregnancy in second trimester       All other systems reviewed and are negative.     The additional following portions of the patient's history were reviewed and updated as appropriate: allergies, current medications, past family history, past medical history, past social history, past surgical history, and problem list.      EXAM:     Vital signs: /82   Wt 65.3 kg (144 lb)   LMP  (LMP Unknown) Comment: pt. think her last period was about 2 mo. hasn't been sexually active for 2 mo. doesn't know if she could be pregnant because she hasn't checked  BMI 23.96 kg/m²   Appearance/psychiatric: To be in no distress  Constitutional: The patient is well nourished.  Cardiovascular: She does not have edema.  Respiratory: Respiratory effort is normal.  Gastrointestinal: Abdomen is soft, gravid, nontender, no rashes, heart tones are present, fundal height is size equals dates    Pelvic Exam: No    Urine glucose/protein: See prenatal flowsheet       Assessment and Plan    Problem List Items Addressed This Visit          Gravid and     Rh negative, antepartum - Primary    Overview     Plan RhoGAM at 28 weeks         Supervision of normal first pregnancy, antepartum     Overview     PHAM finalized: 7/17/24 per estimated conception, confirmed by 18-week US    Optional testing NIPS,CF/SMA,AFP:  NIPS low risk XX, considering CF/SMA, AFP    COVID: Recommended 1/31/24  Flu: Recommended 1/31/24  Tdap:    Anatomy US: 2/28/24 normal anatomy except for limited views of cranial structures due to positioning, posterior placenta,  g, 76%, AC 53%.  Follow up ordered   Follow up US: 3/29/24 normal anatomy, including cranial structures.  EFW 14.6%, AC 16.5%, follow up ordered  4/11/2024  (45) AC (41) ERIC 15.86 Vertex PP grade 1          Relevant Orders    POC Urinalysis Dipstick (Completed)       Impression  Pregnancy at 26w1d  Fetal status reassuring.   Activity and Exercise discussed.    Plan  Follow-up ultrasound today demonstrating appropriate fetal growth.  Follow-up as clinically indicated.  Rh-, RhoGAM therapy plan ordered.  Question of isoimmunization and need for RhoGAM.  Second trimester labs including 1 hour glucose tolerance testing and CBC to be done today.  Discussed the need for aspirin and concern for possible vascular changes associated with COVID.  Continue baby aspirin nightly.  Return to office in 2 weeks      Patient was counseled to the following pregnancy precautions:  Decreased fetal movement, if concern for decreased fetal movement please perform fetal kick counts you are looking for 10 movements in 2 hours.  If concern for fetal movement and not meeting that criteria, please present to triage for evaluation.  Contractions occurring every 5 minutes for over an hour, lasting 30 to 60 seconds and progressively causing more discomfort, please seek medical attention to rule out labor  If you believe that your water is broken, place a sanitary pad.  If pad fills in short period of time i.e. less than 5 minutes, take off pad placed another pad.  If this is saturated please present for rule out rupture of membranes  Vaginal bleeding can be normal in pregnancy, this  usually takes a form of spotting.  If having heavier bleeding like a menstrual period please present for evaluation; especially in light of severe abdominal pain this could represent a placental abruption.  Keep all scheduled appointments as recommended.        Hollis Thomson MD  04/11/2024

## 2024-04-11 NOTE — TELEPHONE ENCOUNTER
----- Message from Hollis Thomson MD sent at 4/11/2024 12:28 PM EDT -----  Normal 1 hour glucose testing.  Please notify patient.

## 2024-04-24 ENCOUNTER — ROUTINE PRENATAL (OUTPATIENT)
Dept: OBSTETRICS AND GYNECOLOGY | Facility: CLINIC | Age: 29
End: 2024-04-24
Payer: COMMERCIAL

## 2024-04-24 ENCOUNTER — PATIENT OUTREACH (OUTPATIENT)
Dept: LABOR AND DELIVERY | Facility: HOSPITAL | Age: 29
End: 2024-04-24
Payer: COMMERCIAL

## 2024-04-24 VITALS — SYSTOLIC BLOOD PRESSURE: 109 MMHG | WEIGHT: 151 LBS | DIASTOLIC BLOOD PRESSURE: 73 MMHG | BODY MASS INDEX: 25.13 KG/M2

## 2024-04-24 DIAGNOSIS — U07.1 COVID-19 AFFECTING PREGNANCY IN SECOND TRIMESTER: ICD-10-CM

## 2024-04-24 DIAGNOSIS — Z34.00 SUPERVISION OF NORMAL FIRST PREGNANCY, ANTEPARTUM: Primary | ICD-10-CM

## 2024-04-24 DIAGNOSIS — O26.899 RH NEGATIVE, ANTEPARTUM: ICD-10-CM

## 2024-04-24 DIAGNOSIS — O98.512 COVID-19 AFFECTING PREGNANCY IN SECOND TRIMESTER: ICD-10-CM

## 2024-04-24 DIAGNOSIS — Z67.91 RH NEGATIVE, ANTEPARTUM: ICD-10-CM

## 2024-04-24 LAB
GLUCOSE UR STRIP-MCNC: NEGATIVE MG/DL
PROT UR STRIP-MCNC: NEGATIVE MG/DL

## 2024-04-24 NOTE — OUTREACH NOTE
[facility]  Check-In    Current Estimated Gestational Age: 28w0d      Questions/Answers      Flowsheet Row Responses   Best Method for Contacting Cell   Demographics Reviewed Yes   Currently Employed Yes   Able to keep appointments as scheduled Yes   Baby Active/Feeling Fetal Movemen Yes   How are you presently feeling? Good   Supplies ready for baby Breast Pump   Resource/Environmental Concerns None   Do you have any questions related to your care experience, your pregnancy, plans for delivery, any concerns, etc? No   Other Education How to find a pediatrician            Pediatrician: undecided  FOB:   Feeding Plan: breast feeding, has ordered pump. Encouraged to bring to hospital when she delivers.     No current concerns with food, housing or transportation.  Encouraged to reach out for any questions, needs or concerns.    Carmen Chu RN  Maternity Nurse Navigator    4/24/2024, 09:36 EDT

## 2024-04-24 NOTE — PROGRESS NOTES
OB FOLLOW UP  Complaint   Chief Complaint   Patient presents with    Routine Prenatal Visit            Juanito Newberry is a 28 y.o.  28w0d patient being seen today for her obstetrical follow up visit. Patient denies decreased fetal movement, contractions, loss of fluid or vaginal bleeding. No acute complaints. Rhogam administration scheduled for tomorrow. Not sleeping well at night. Has TV on when trying to sleep.     Her prenatal care is complicated by (and status) :    Patient Active Problem List   Diagnosis    Supervision of normal first pregnancy, antepartum    Rh negative, antepartum    COVID-19 affecting pregnancy in second trimester       All other systems reviewed and are negative.     The additional following portions of the patient's history were reviewed and updated as appropriate: allergies, current medications, past family history, past medical history, past social history, past surgical history, and problem list.      EXAM:     Vital signs: /73   Wt 68.5 kg (151 lb)   LMP  (LMP Unknown) Comment: pt. think her last period was about 2 mo. hasn't been sexually active for 2 mo. doesn't know if she could be pregnant because she hasn't checked  BMI 25.13 kg/m²   Appearance/psychiatric: To be in no distress  Constitutional: The patient is well nourished.  Cardiovascular: She does not have edema.  Respiratory: Respiratory effort is normal.  Gastrointestinal: Abdomen is soft, gravid, nontender, no rashes, heart tones are present, fundal height is size equals dates    Pelvic Exam: No    Urine glucose/protein: See prenatal flowsheet       Assessment and Plan    Problem List Items Addressed This Visit          Gravid and     Rh negative, antepartum    Overview     Plan RhoGAM at 28 weeks         Supervision of normal first pregnancy, antepartum - Primary    Overview     PHAM finalized: 24 per estimated conception, confirmed by 18-week US    Optional testing NIPS,CF/SMA,AFP:  NIPS low  risk XX, considering CF/SMA, AFP    COVID: Recommended 1/31/24  Flu: Recommended 1/31/24  Tdap:    Anatomy US: 2/28/24 normal anatomy except for limited views of cranial structures due to positioning, posterior placenta,  g, 76%, AC 53%.  Follow up ordered   Follow up US: 3/29/24 normal anatomy, including cranial structures.  EFW 14.6%, AC 16.5%, follow up ordered  4/11/2024  (45) AC (41) ERIC 15.86 Vertex PP grade 1          Relevant Orders    POC Urinalysis Dipstick (Completed)       Other    COVID-19 affecting pregnancy in second trimester    Relevant Medications    aspirin 81 MG EC tablet    oseltamivir (TAMIFLU) 75 MG capsule       Impression  Pregnancy at 28w0d  Fetal status reassuring.   Activity and Exercise discussed.    Plan  Rhogam for Rh negative status 4/25/2024  Tdap Rx provided  Sleep hygiene discussed. Unisom to pharmacy PRN      Patient was counseled to the following pregnancy precautions:  Decreased fetal movement, if concern for decreased fetal movement please perform fetal kick counts you are looking for 10 movements in 2 hours.  If concern for fetal movement and not meeting that criteria, please present to triage for evaluation.  Contractions occurring every 5 minutes for over an hour, lasting 30 to 60 seconds and progressively causing more discomfort, please seek medical attention to rule out labor  If you believe that your water is broken, place a sanitary pad.  If pad fills in short period of time i.e. less than 5 minutes, take off pad placed another pad.  If this is saturated please present for rule out rupture of membranes  Vaginal bleeding can be normal in pregnancy, this usually takes a form of spotting.  If having heavier bleeding like a menstrual period please present for evaluation; especially in light of severe abdominal pain this could represent a placental abruption.  Keep all scheduled appointments as recommended.        Hollis Thomson MD  04/24/2024

## 2024-04-25 ENCOUNTER — LAB (OUTPATIENT)
Dept: LAB | Facility: HOSPITAL | Age: 29
End: 2024-04-25
Payer: COMMERCIAL

## 2024-04-25 ENCOUNTER — HOSPITAL ENCOUNTER (OUTPATIENT)
Dept: INFUSION THERAPY | Facility: HOSPITAL | Age: 29
Discharge: HOME | End: 2024-04-25
Payer: COMMERCIAL

## 2024-04-25 VITALS
OXYGEN SATURATION: 100 % | BODY MASS INDEX: 25.02 KG/M2 | WEIGHT: 150.35 LBS | DIASTOLIC BLOOD PRESSURE: 67 MMHG | RESPIRATION RATE: 18 BRPM | TEMPERATURE: 97.7 F | HEART RATE: 107 BPM | SYSTOLIC BLOOD PRESSURE: 121 MMHG

## 2024-04-25 DIAGNOSIS — O26.899 RH NEGATIVE, ANTEPARTUM: Primary | ICD-10-CM

## 2024-04-25 DIAGNOSIS — O26.899 RH NEGATIVE, ANTEPARTUM: ICD-10-CM

## 2024-04-25 DIAGNOSIS — Z67.91 RH NEGATIVE, ANTEPARTUM: Primary | ICD-10-CM

## 2024-04-25 DIAGNOSIS — Z67.91 RH NEGATIVE, ANTEPARTUM: ICD-10-CM

## 2024-04-25 LAB
ABO GROUP BLD: NORMAL
BLD GP AB SCN SERPL QL: NEGATIVE
NUMBER OF DOSES: ABNORMAL
RH BLD: NEGATIVE

## 2024-04-25 PROCEDURE — 86901 BLOOD TYPING SEROLOGIC RH(D): CPT

## 2024-04-25 PROCEDURE — 86900 BLOOD TYPING SEROLOGIC ABO: CPT

## 2024-04-25 PROCEDURE — 25010000002 RHO D IMMUNE GLOBULIN 1500 UNIT/2ML SOLUTION PREFILLED SYRINGE: Performed by: STUDENT IN AN ORGANIZED HEALTH CARE EDUCATION/TRAINING PROGRAM

## 2024-04-25 PROCEDURE — 96372 THER/PROPH/DIAG INJ SC/IM: CPT

## 2024-04-25 PROCEDURE — 36415 COLL VENOUS BLD VENIPUNCTURE: CPT

## 2024-04-25 PROCEDURE — 86850 RBC ANTIBODY SCREEN: CPT

## 2024-04-25 RX ADMIN — HUMAN RHO(D) IMMUNE GLOBULIN 1500 UNITS: 1500 SOLUTION INTRAMUSCULAR; INTRAVENOUS at 14:19

## 2024-05-07 ENCOUNTER — ROUTINE PRENATAL (OUTPATIENT)
Dept: OBSTETRICS AND GYNECOLOGY | Facility: CLINIC | Age: 29
End: 2024-05-07
Payer: COMMERCIAL

## 2024-05-07 VITALS — DIASTOLIC BLOOD PRESSURE: 71 MMHG | SYSTOLIC BLOOD PRESSURE: 115 MMHG | WEIGHT: 152 LBS | BODY MASS INDEX: 25.29 KG/M2

## 2024-05-07 DIAGNOSIS — Z34.00 SUPERVISION OF NORMAL FIRST PREGNANCY, ANTEPARTUM: Primary | ICD-10-CM

## 2024-05-07 LAB
GLUCOSE UR STRIP-MCNC: NEGATIVE MG/DL
PROT UR STRIP-MCNC: NEGATIVE MG/DL

## 2024-05-07 PROCEDURE — 99213 OFFICE O/P EST LOW 20 MIN: CPT | Performed by: NURSE PRACTITIONER

## 2024-05-23 ENCOUNTER — ROUTINE PRENATAL (OUTPATIENT)
Dept: OBSTETRICS AND GYNECOLOGY | Facility: CLINIC | Age: 29
End: 2024-05-23
Payer: COMMERCIAL

## 2024-05-23 ENCOUNTER — HOSPITAL ENCOUNTER (OUTPATIENT)
Facility: HOSPITAL | Age: 29
Discharge: HOME | End: 2024-05-23
Attending: OBSTETRICS & GYNECOLOGY | Admitting: OBSTETRICS & GYNECOLOGY
Payer: COMMERCIAL

## 2024-05-23 VITALS — DIASTOLIC BLOOD PRESSURE: 73 MMHG | SYSTOLIC BLOOD PRESSURE: 139 MMHG | WEIGHT: 161 LBS | BODY MASS INDEX: 26.79 KG/M2

## 2024-05-23 VITALS — RESPIRATION RATE: 16 BRPM | DIASTOLIC BLOOD PRESSURE: 85 MMHG | SYSTOLIC BLOOD PRESSURE: 128 MMHG | HEART RATE: 100 BPM

## 2024-05-23 DIAGNOSIS — O16.3 ELEVATED BLOOD PRESSURE AFFECTING PREGNANCY IN THIRD TRIMESTER, ANTEPARTUM: ICD-10-CM

## 2024-05-23 DIAGNOSIS — O98.512 COVID-19 AFFECTING PREGNANCY IN SECOND TRIMESTER: ICD-10-CM

## 2024-05-23 DIAGNOSIS — Z67.91 RH NEGATIVE, ANTEPARTUM: ICD-10-CM

## 2024-05-23 DIAGNOSIS — U07.1 COVID-19 AFFECTING PREGNANCY IN SECOND TRIMESTER: ICD-10-CM

## 2024-05-23 DIAGNOSIS — O26.899 RH NEGATIVE, ANTEPARTUM: ICD-10-CM

## 2024-05-23 DIAGNOSIS — Z34.00 SUPERVISION OF NORMAL FIRST PREGNANCY, ANTEPARTUM: Primary | ICD-10-CM

## 2024-05-23 LAB
ALBUMIN SERPL-MCNC: 3.5 G/DL (ref 3.5–5.2)
ALBUMIN/GLOB SERPL: 1.3 G/DL
ALP SERPL-CCNC: 76 U/L (ref 39–117)
ALT SERPL W P-5'-P-CCNC: 13 U/L (ref 1–33)
ANION GAP SERPL CALCULATED.3IONS-SCNC: 14 MMOL/L (ref 5–15)
AST SERPL-CCNC: 13 U/L (ref 1–32)
BILIRUB SERPL-MCNC: <0.2 MG/DL (ref 0–1.2)
BUN SERPL-MCNC: 5 MG/DL (ref 6–20)
BUN/CREAT SERPL: 10.9 (ref 7–25)
CALCIUM SPEC-SCNC: 8.7 MG/DL (ref 8.6–10.5)
CHLORIDE SERPL-SCNC: 103 MMOL/L (ref 98–107)
CO2 SERPL-SCNC: 17 MMOL/L (ref 22–29)
CREAT SERPL-MCNC: 0.46 MG/DL (ref 0.57–1)
CREAT UR-MCNC: 118.6 MG/DL
DEPRECATED RDW RBC AUTO: 39.1 FL (ref 37–54)
EGFRCR SERPLBLD CKD-EPI 2021: 134.7 ML/MIN/1.73
ERYTHROCYTE [DISTWIDTH] IN BLOOD BY AUTOMATED COUNT: 13.2 % (ref 12.3–15.4)
GLOBULIN UR ELPH-MCNC: 2.8 GM/DL
GLUCOSE SERPL-MCNC: 102 MG/DL (ref 65–99)
GLUCOSE UR STRIP-MCNC: NEGATIVE MG/DL
HCT VFR BLD AUTO: 33 % (ref 34–46.6)
HGB BLD-MCNC: 10.9 G/DL (ref 12–15.9)
MCH RBC QN AUTO: 27 PG (ref 26.6–33)
MCHC RBC AUTO-ENTMCNC: 33 G/DL (ref 31.5–35.7)
MCV RBC AUTO: 81.9 FL (ref 79–97)
PLATELET # BLD AUTO: 239 10*3/MM3 (ref 140–450)
PMV BLD AUTO: 10.4 FL (ref 6–12)
POTASSIUM SERPL-SCNC: 3.7 MMOL/L (ref 3.5–5.2)
PROT ?TM UR-MCNC: 10.8 MG/DL
PROT SERPL-MCNC: 6.3 G/DL (ref 6–8.5)
PROT UR STRIP-MCNC: NEGATIVE MG/DL
PROT/CREAT UR: 0.09 MG/G{CREAT}
RBC # BLD AUTO: 4.03 10*6/MM3 (ref 3.77–5.28)
SODIUM SERPL-SCNC: 134 MMOL/L (ref 136–145)
WBC NRBC COR # BLD AUTO: 8.43 10*3/MM3 (ref 3.4–10.8)

## 2024-05-23 PROCEDURE — 85027 COMPLETE CBC AUTOMATED: CPT | Performed by: OBSTETRICS & GYNECOLOGY

## 2024-05-23 PROCEDURE — 82570 ASSAY OF URINE CREATININE: CPT | Performed by: OBSTETRICS & GYNECOLOGY

## 2024-05-23 PROCEDURE — G0463 HOSPITAL OUTPT CLINIC VISIT: HCPCS

## 2024-05-23 PROCEDURE — 59025 FETAL NON-STRESS TEST: CPT

## 2024-05-23 PROCEDURE — 80053 COMPREHEN METABOLIC PANEL: CPT | Performed by: OBSTETRICS & GYNECOLOGY

## 2024-05-23 PROCEDURE — 84156 ASSAY OF PROTEIN URINE: CPT | Performed by: OBSTETRICS & GYNECOLOGY

## 2024-05-23 NOTE — NON STRESS TEST
Obstetrical Non-stress Test Interpretation     Name:  Juanito Newberry  MRN: [id number]    28 y.o. female  at 32w1d    Indication: sent from office for elevated blood pressure      Fetal Movement: active  Fetal HR Assessment Method: external  Fetal HR (beats/min): 130  Fetal HR Baseline: normal range  Fetal HR Variability: moderate (amplitude range 6 to 25 bpm)  Fetal HR Accelerations: greater than/equal to 15 bpm, lasting at least 15 seconds  Fetal HR Decelerations: absent  Sinusoidal Pattern Present: absent    Patient Vitals for the past 24 hrs:   BP Pulse Resp   24 1400 128/85 100 --   24 1346 116/73 101 --   24 1330 123/70 91 --   24 1324 121/71 89 16       Reason for test: OB Triage, Hypertension (sent from office with elevated blood pressure)  Date of Test: 2024  Time frame of test: 2231-8830  RN NST Interpretation: Reactive      Ritika Delgado RN  2024  14:17 EDT

## 2024-05-23 NOTE — H&P
QUENTIN Leonardo  Obstetric History and Physical    Chief Complaint   Patient presents with    Elevated Blood Pressure     Sent from office       Subjective     HPI:    Patient is a 28 y.o. female  currently at 32w1d, who presents with complaints of mildly elevated BP in the office 142/81 with repeat /73.  She was sent to triage for serial blood pressures and preEclampsia labs.  She reports having visual disturbances yesterday described as floating stars.  Today she denies headache, visual changes, nausea, vomiting or epigastric pain.  She reports good fetal movement and denies leaking fluid, vaginal bleeding or contractions.    Her prenatal care is complicated by Covid and flu infection during her second trimester.  Her previous obstetric/gynecological history is noted for   .    The following portions of the patients history were reviewed and updated as appropriate:   current medications, allergies, past medical history, past surgical history, past family history, past social history and current problem list.     Prenatal Information:  Prenatal Results       Initial Prenatal Labs       Test Value Reference Range Date Time    Hemoglobin  11.2 g/dL 12.0 - 15.9 24 1111       11.1 g/dL 12.0 - 15.9 24 0928    Hematocrit  32.1 % 34.0 - 46.6 24 1111       33.3 % 34.0 - 46.6 24 0928    Platelets  188 10*3/mm3 140 - 450 24 1111       180 10*3/mm3 140 - 450 24 0928    Rubella IgG  1.56 index Immune >0.99 24 1111    Hepatitis B SAg  Non-Reactive  Non-Reactive 24 1111    Hepatitis C Ab  Non-Reactive  Non-Reactive 24 1111    RPR        T. Pallidum Ab   Non-Reactive  Non-Reactive 24 1111    ABO  O   24 1020    Rh  Negative   24 1020    Antibody Screen  Negative   24 1111    HIV        Urine Culture  No growth   24 1103    Gonorrhea  Negative  Negative 24 1103    Chlamydia  Negative  Negative 24 1103    TSH        HgB A1c          Varicella IgG        HgB Electrophoresis         Cystic fibrosis                   Fetal testing        Test Value Reference Range Date Time    NIPT        MSAFP        AFP-4                  2nd and 3rd Trimester       Test Value Reference Range Date Time    Hemoglobin (repeated)  10.9 g/dL 12.0 - 15.9 05/23/24 1318       10.9 g/dL 12.0 - 15.9 04/11/24 1016       10.2 g/dL 12.0 - 15.9 03/13/24 1749    Hematocrit (repeated)  33.0 % 34.0 - 46.6 05/23/24 1318       32.1 % 34.0 - 46.6 04/11/24 1016       30.3 % 34.0 - 46.6 03/13/24 1749    Platelets   239 10*3/mm3 140 - 450 05/23/24 1318       205 10*3/mm3 140 - 450 04/11/24 1016       177 10*3/mm3 140 - 450 03/13/24 1749       188 10*3/mm3 140 - 450 01/31/24 1111       180 10*3/mm3 140 - 450 01/24/24 0928    GCT  88 mg/dL 65 - 139 04/11/24 1016    Antibody Screen (repeated)  Negative   04/25/24 1020    3rd TM syphilis scrn (repeated)  RPR         3rd TM syphilis scrn (repeated) FTA        GTT Fasting        GTT 1 Hr        GTT 2 Hr        GTT 3 Hr        Group B Strep                  Other testing        Test Value Reference Range Date Time    Parvo IgG         CMV IgG                   Drug Screening       Test Value Reference Range Date Time    Amphetamine Screen        Barbiturate Screen  Negative  Negative 01/31/24 1103    Benzodiazepine Screen  Negative  Negative 01/31/24 1103    Methadone Screen  Negative  Negative 01/31/24 1103    Phencyclidine Screen        Opiates Screen  Negative  Negative 01/31/24 1103    THC Screen  Positive  Negative 01/31/24 1103    Cocaine Screen  Negative  Negative 01/31/24 1103    Propoxyphene Screen        Buprenorphine Screen        Methamphetamine Screen        Oxycodone Screen  Negative  Negative 01/31/24 1103    Tricyclic Antidepressants Screen                  Legend    ^: Historical                          External Prenatal Results       Pregnancy Outside Results - Transcribed From Office Records - See Scanned Records For  Details       Test Value Date Time    ABO  O  24 1020    Rh  Negative  24 1020    Antibody Screen  Negative  24 1020       Negative  24 1111    Varicella IgG       Rubella  1.56 index 24 1111    Hgb  10.9 g/dL 24 1318       10.9 g/dL 24 1016       10.2 g/dL 24 1749       11.2 g/dL 24 1111       11.1 g/dL 24 0928    Hct  33.0 % 24 1318       32.1 % 24 1016       30.3 % 24 1749       32.1 % 24 1111       33.3 % 24 0928    Glucose Fasting GTT       Glucose Tolerance Test 1 hour       Glucose Tolerance Test 3 hour       Gonorrhea (discrete)  Negative  24 1103    Chlamydia (discrete)  Negative  24 1103    RPR       VDRL       Syphilis Antibody       HBsAg  Non-Reactive  24 1111    Herpes Simplex Virus PCR       Herpes Simplex VIrus Culture       HIV       Hep C RNA Quant PCR       Hep C Antibody  Non-Reactive  24 1111    AFP       Group B Strep       GBS Susceptibility to Clindamycin       GBS Susceptibility to Erythromycin       Fetal Fibronectin       Genetic Testing, Maternal Blood                 Drug Screening       Test Value Date Time    NIPT        Urine Drug Screen       Amphetamine Screen       Barbiturate Screen  Negative  24 1103    Benzodiazepine Screen  Negative  24 1103    Methadone Screen  Negative  24 1103    Phencyclidine Screen       Opiates Screen  Negative  24 1103    THC Screen  Positive  24 1103    Cocaine Screen       Propoxyphene Screen       Buprenorphine Screen       Methamphetamine Screen       Oxycodone Screen  Negative  24 1103              Legend    ^: Historical                             Past OB History:     OB History    Para Term  AB Living   1 0 0 0 0 0   SAB IAB Ectopic Molar Multiple Live Births   0 0 0 0 0 0      # Outcome Date GA Lbr Cristian/2nd Weight Sex Type Anes PTL Lv   1 Current                Past Medical History:  History reviewed. No pertinent past medical history.   Past Surgical History History reviewed. No pertinent surgical history.   Family History: Family History   Problem Relation Age of Onset    Diabetes Paternal Grandmother     Depression Neg Hx     COPD Neg Hx     Cancer Neg Hx     Bleeding Disorder Neg Hx     Birth defects Neg Hx     Asthma Neg Hx     Arthritis Neg Hx     Alcohol abuse Neg Hx     Drug abuse Neg Hx     Early death Neg Hx     Hearing loss Neg Hx     Heart disease Neg Hx     Hyperlipidemia Neg Hx     Hypertension Neg Hx     Kidney disease Neg Hx     Learning disabilities Neg Hx     Malig Hyperthermia Neg Hx     Mental illness Neg Hx     Mental retardation Neg Hx     Miscarriages / Stillbirths Neg Hx       Social History:  reports that she has never smoked. She has been exposed to tobacco smoke. She has never used smokeless tobacco.   reports that she does not currently use alcohol.   reports that she does not currently use drugs after having used the following drugs: Marijuana.        General ROS: Pertinent items are noted in HPI  Home Medications:  aspirin, doxylamine, oseltamivir, and prenatal vitamin    Allergies:  No Known Allergies    Objective       Vital Signs Range for the last 24 hours  Temperature:     Temp Source:     BP: BP: (116-142)/(70-85) 128/85   Pulse: Heart Rate:  [] 100   Respirations: Resp:  [16] 16   SPO2:       Vitals:    05/23/24 1324 05/23/24 1330 05/23/24 1346 05/23/24 1400   BP: 121/71 123/70 116/73 128/85   BP Location: Right arm Right arm Right arm Right arm   Patient Position: Sitting Sitting Sitting Sitting   Pulse: 89 91 101 100   Resp: 16           Physical Examination:   General appearance - alert, well appearing, and in no distress  Mental status - alert, oriented to person, place, and time  Chest - clear to auscultation  Heart - normal rate, regular rhythm,  Abdomen - soft, nontender, nondistended,   Pelvic - normal external genitalia, vulva, vagina, cervix,  and uterus   Back exam - full range of motion, no tenderness or pain on motion  Neurological - alert, oriented, normal speech.  DTR's 2+ brisk, negative for clonus  Extremities - peripheral pulses normal, 1+-2+ lower ext edema  Skin - normal coloration and turgor, no suspicious skin lesions noted    Presentation: Exam deferred   Cervix:     Dilation:     Effacement:     Station:         Fetal Heart Rate Assessment   Method: Fetal HR Assessment Method: external   Beats/min: Fetal HR (beats/min): 130   Baseline: Fetal HR Baseline: normal range   Variability: Fetal HR Variability: moderate (amplitude range 6 to 25 bpm)   Accels: Fetal HR Accelerations: greater than/equal to 15 bpm, lasting at least 15 seconds   Decels: Fetal HR Decelerations: absent   Tracing Category:       Uterine Assessment   Method: Method: palpation, external tocotransducer   Frequency (min): Contraction Frequency (Minutes): occasional   Ctx Count in 10 min:     Duration:     Intensity: Contraction Intensity: mild by palpation   [place] Units:       GBS is unknown     Results for orders placed or performed during the hospital encounter of 05/23/24   CBC (No Diff)    Specimen: Blood   Result Value Ref Range    WBC 8.43 3.40 - 10.80 10*3/mm3    RBC 4.03 3.77 - 5.28 10*6/mm3    Hemoglobin 10.9 (L) 12.0 - 15.9 g/dL    Hematocrit 33.0 (L) 34.0 - 46.6 %    MCV 81.9 79.0 - 97.0 fL    MCH 27.0 26.6 - 33.0 pg    MCHC 33.0 31.5 - 35.7 g/dL    RDW 13.2 12.3 - 15.4 %    RDW-SD 39.1 37.0 - 54.0 fl    MPV 10.4 6.0 - 12.0 fL    Platelets 239 140 - 450 10*3/mm3   Comprehensive Metabolic Panel    Specimen: Blood   Result Value Ref Range    Glucose 102 (H) 65 - 99 mg/dL    BUN 5 (L) 6 - 20 mg/dL    Creatinine 0.46 (L) 0.57 - 1.00 mg/dL    Sodium 134 (L) 136 - 145 mmol/L    Potassium 3.7 3.5 - 5.2 mmol/L    Chloride 103 98 - 107 mmol/L    CO2 17.0 (L) 22.0 - 29.0 mmol/L    Calcium 8.7 8.6 - 10.5 mg/dL    Total Protein 6.3 6.0 - 8.5 g/dL    Albumin 3.5 3.5 - 5.2  "g/dL    ALT (SGPT) 13 1 - 33 U/L    AST (SGOT) 13 1 - 32 U/L    Alkaline Phosphatase 76 39 - 117 U/L    Total Bilirubin <0.2 0.0 - 1.2 mg/dL    Globulin 2.8 gm/dL    A/G Ratio 1.3 g/dL    BUN/Creatinine Ratio 10.9 7.0 - 25.0    Anion Gap 14.0 5.0 - 15.0 mmol/L    eGFR 134.7 >60.0 mL/min/1.73   Protein / Creatinine Ratio, Urine - Urine, Clean Catch    Specimen: Urine, Clean Catch   Result Value Ref Range    Creatinine, Urine 118.6 mg/dL    Total Protein, Urine 10.8 mg/dL    Protein/Creatinine Ratio, Urine 0.09           Assessment & Plan       Elevated blood pressure affecting pregnancy in third trimester, antepartum        Assessment:  1.  Intrauterine pregnancy at 32w1d gestation with reactive, reassuring fetal status.    2.  No evidence of Pre Eclampsia  3.  Obstetrical history significant for   .  4.  GBS status: No results found for: \"STREPGPB\"    Plan:  1. Discharge to home.    2.  Plan of care has been reviewed with patient and patient agrees.   3.  Risks, benefits of treatment plan have been discussed.  4.  All questions have been answered.  5.  Keep next routine scheduled OB visit with Dr Thomson.24    Mirian Adame MD    Electronically signed by Mirian Adame MD, 24, 4:04 PM EDT.     "

## 2024-05-23 NOTE — PROGRESS NOTES
OB FOLLOW UP  Complaint   Chief Complaint   Patient presents with    Routine Prenatal Visit            Juanito Newberry is a 28 y.o.  32w1d patient being seen today for her obstetrical follow up visit. Patient denies decreased fetal movement, contractions, loss of fluid or vaginal bleeding.  Patient reports that she has been having visual disturbances over the last 24 hours as well as noting swelling in her ankles.  Denies any headaches or nausea and vomiting or right upper quadrant pain.    Her prenatal care is complicated by (and status) :    Patient Active Problem List   Diagnosis    Supervision of normal first pregnancy, antepartum    Rh negative, antepartum    COVID-19 affecting pregnancy in second trimester    Elevated blood pressure affecting pregnancy in third trimester, antepartum       All other systems reviewed and are negative.     The additional following portions of the patient's history were reviewed and updated as appropriate: allergies, current medications, past family history, past medical history, past social history, past surgical history, and problem list.      EXAM:     Vital signs: /73   Wt 73 kg (161 lb)   LMP  (LMP Unknown) Comment: pt. think her last period was about 2 mo. hasn't been sexually active for 2 mo. doesn't know if she could be pregnant because she hasn't checked  BMI 26.79 kg/m²   Appearance/psychiatric: To be in no distress  Constitutional: The patient is well nourished.  Cardiovascular: She does not have 1+edema to ankles  Respiratory: Respiratory effort is normal.  Gastrointestinal: Abdomen is soft, gravid, nontender, no rashes, heart tones are present, fundal height is size equals dates    Pelvic Exam: No    Urine glucose/protein: See prenatal flowsheet       Assessment and Plan    Problem List Items Addressed This Visit          Gravid and     Supervision of normal first pregnancy, antepartum - Primary    Overview     PHAM finalized: 24 per  estimated conception, confirmed by 18-week US    Optional testing NIPS,CF/SMA,AFP:  NIPS low risk XX, considering CF/SMA, AFP    COVID: Recommended 1/31/24  Flu: Recommended 1/31/24  Tdap: 4/24/2024 Rx     Anatomy US: 2/28/24 normal anatomy except for limited views of cranial structures due to positioning, posterior placenta,  g, 76%, AC 53%.  Follow up ordered   Follow up US: 3/29/24 normal anatomy, including cranial structures.  EFW 14.6%, AC 16.5%, follow up ordered  4/11/2024  (45) AC (41) ERIC 15.86 Vertex PP grade 1          Relevant Medications    doxylamine (UNISOM) 25 MG tablet    Other Relevant Orders    POC Urinalysis Dipstick (Completed)       Impression  Pregnancy at 32w1d  Fetal status reassuring.   Activity and Exercise discussed.    Plan  Elevated blood pressure without diagnosis of hypertension -recommendation for patient to be evaluated on labor and delivery for preeclampsia workup.  CBC, CMP and urine protein creatinine ratio called to be done.  OB hospitalist notified.  Recommendation for patient to get blood pressure monitor to be monitoring blood pressures at home.  Patient was given symptoms of headache not relieved with Tylenol, visual disturbances, right upper quadrant pain with nausea and vomiting or increased swelling in lower extremities with elevated blood pressures greater than 140/90 as needs to seek immediate medical care.  Additionally if patient were to have blood pressures greater than 160/110 patient would need to be seen regardless if she was asymptomatic or not.  History of COVID in pregnancy -begin weekly nonstress test.  Continue with baby aspirin.  Return to office in 2 weeks      Patient was counseled to the following pregnancy precautions:  Decreased fetal movement, if concern for decreased fetal movement please perform fetal kick counts you are looking for 10 movements in 2 hours.  If concern for fetal movement and not meeting that criteria, please present to  triage for evaluation.  Contractions occurring every 5 minutes for over an hour, lasting 30 to 60 seconds and progressively causing more discomfort, please seek medical attention to rule out labor  If you believe that your water is broken, place a sanitary pad.  If pad fills in short period of time i.e. less than 5 minutes, take off pad placed another pad.  If this is saturated please present for rule out rupture of membranes  Vaginal bleeding can be normal in pregnancy, this usually takes a form of spotting.  If having heavier bleeding like a menstrual period please present for evaluation; especially in light of severe abdominal pain this could represent a placental abruption.  Keep all scheduled appointments as recommended.        Hollis Thomson MD  05/23/2024

## 2024-05-30 ENCOUNTER — HOSPITAL ENCOUNTER (OUTPATIENT)
Dept: LABOR AND DELIVERY | Facility: HOSPITAL | Age: 29
Discharge: HOME | End: 2024-05-30
Payer: COMMERCIAL

## 2024-05-30 ENCOUNTER — HOSPITAL ENCOUNTER (OUTPATIENT)
Facility: HOSPITAL | Age: 29
Discharge: HOME | End: 2024-05-30
Attending: OBSTETRICS & GYNECOLOGY | Admitting: OBSTETRICS & GYNECOLOGY
Payer: COMMERCIAL

## 2024-05-30 VITALS — SYSTOLIC BLOOD PRESSURE: 107 MMHG | HEART RATE: 92 BPM | RESPIRATION RATE: 16 BRPM | DIASTOLIC BLOOD PRESSURE: 59 MMHG

## 2024-05-30 DIAGNOSIS — U07.1 COVID-19 AFFECTING PREGNANCY IN SECOND TRIMESTER: ICD-10-CM

## 2024-05-30 DIAGNOSIS — O98.512 COVID-19 AFFECTING PREGNANCY IN SECOND TRIMESTER: ICD-10-CM

## 2024-05-30 PROCEDURE — 59025 FETAL NON-STRESS TEST: CPT

## 2024-05-30 PROCEDURE — 59025 FETAL NON-STRESS TEST: CPT | Performed by: OBSTETRICS & GYNECOLOGY

## 2024-05-30 NOTE — NON STRESS TEST
Obstetrical Non-stress Test Interpretation     Name:  Juanito Newberry  MRN: [id number]    28 y.o. female  at 33w1d    Indication: hx covid during pregnancy       Fetal Movement: active  Fetal HR Assessment Method: external  Fetal HR (beats/min): 140  Fetal HR Baseline: normal range  Fetal HR Variability: moderate (amplitude range 6 to 25 bpm)  Fetal HR Accelerations: greater than/equal to 15 bpm, lasting at least 15 seconds  Fetal HR Decelerations: absent  Sinusoidal Pattern Present: absent    /59 (BP Location: Right arm, Patient Position: Sitting)   Pulse 92   Resp 16   LMP  (LMP Unknown) Comment: pt. think her last period was about 2 mo. hasn't been sexually active for 2 mo. doesn't know if she could be pregnant because she hasn't checked    Reason for test: Other (Comment) (hx covid during pregnancy)  Date of Test: 2024  Time frame of test: 2773-0733  RN NST Interpretation: Reactive      Ritika Delgado RN  2024  15:38 EDT

## 2024-06-04 ENCOUNTER — ROUTINE PRENATAL (OUTPATIENT)
Dept: OBSTETRICS AND GYNECOLOGY | Facility: CLINIC | Age: 29
End: 2024-06-04
Payer: COMMERCIAL

## 2024-06-04 VITALS — WEIGHT: 166 LBS | DIASTOLIC BLOOD PRESSURE: 75 MMHG | SYSTOLIC BLOOD PRESSURE: 117 MMHG | BODY MASS INDEX: 27.62 KG/M2

## 2024-06-04 DIAGNOSIS — K21.9 GASTROESOPHAGEAL REFLUX DISEASE WITHOUT ESOPHAGITIS: ICD-10-CM

## 2024-06-04 DIAGNOSIS — O16.3 ELEVATED BLOOD PRESSURE AFFECTING PREGNANCY IN THIRD TRIMESTER, ANTEPARTUM: ICD-10-CM

## 2024-06-04 DIAGNOSIS — Z34.00 SUPERVISION OF NORMAL FIRST PREGNANCY, ANTEPARTUM: Primary | ICD-10-CM

## 2024-06-04 DIAGNOSIS — O98.513 COVID-19 AFFECTING PREGNANCY IN THIRD TRIMESTER: ICD-10-CM

## 2024-06-04 DIAGNOSIS — L29.9 PRURITUS: ICD-10-CM

## 2024-06-04 DIAGNOSIS — Z67.91 RH NEGATIVE, ANTEPARTUM: ICD-10-CM

## 2024-06-04 DIAGNOSIS — U07.1 COVID-19 AFFECTING PREGNANCY IN THIRD TRIMESTER: ICD-10-CM

## 2024-06-04 DIAGNOSIS — O26.899 RH NEGATIVE, ANTEPARTUM: ICD-10-CM

## 2024-06-04 LAB
GLUCOSE UR STRIP-MCNC: NEGATIVE MG/DL
PROT UR STRIP-MCNC: NEGATIVE MG/DL

## 2024-06-04 RX ORDER — FAMOTIDINE 20 MG/1
20 TABLET, FILM COATED ORAL 2 TIMES DAILY PRN
Qty: 60 TABLET | Refills: 1 | Status: SHIPPED | OUTPATIENT
Start: 2024-06-04

## 2024-06-04 RX ORDER — DIPHENHYDRAMINE HYDROCHLORIDE, ZINC ACETATE 2; .1 G/100G; G/100G
1 CREAM TOPICAL 3 TIMES DAILY PRN
Qty: 1 EACH | Refills: 1 | Status: SHIPPED | OUTPATIENT
Start: 2024-06-04

## 2024-06-04 NOTE — PROGRESS NOTES
OB FOLLOW UP  Complaint   Chief Complaint   Patient presents with    Routine Prenatal Visit            Juanito Newberry is a 28 y.o.  33w6d patient being seen today for her obstetrical follow up visit. Patient denies decreased fetal movement, contractions, loss of fluid or vaginal bleeding.  Patient denies any headache, visual disturbances, new onset nausea vomiting, right upper quadrant pain, or new onset swelling.  Blood pressure cuff has been cost prohibitive.  She is attempting to get 1 through [facility] [employer].  Has been having generalized pruritus on her abdomen and her chest.  No itching on palms of hands or soles of feet.  Has attempted vitamin E oil that gives temporary relief.  Has been noting burning in the back of her throat typically in the mornings and late evening.  Compliant with nonstress test, baby aspirin and prenatal vitamin.  No other acute complaints.      Her prenatal care is complicated by (and status) :    Patient Active Problem List   Diagnosis    Supervision of normal first pregnancy, antepartum    Rh negative, antepartum    COVID-19 affecting pregnancy in third trimester    Elevated blood pressure affecting pregnancy in third trimester, antepartum       All other systems reviewed and are negative.     The additional following portions of the patient's history were reviewed and updated as appropriate: allergies, current medications, past family history, past medical history, past social history, past surgical history, and problem list.      EXAM:     Vital signs: /75   Wt 75.3 kg (166 lb)   LMP  (LMP Unknown) Comment: pt. think her last period was about 2 mo. hasn't been sexually active for 2 mo. doesn't know if she could be pregnant because she hasn't checked  BMI 27.62 kg/m²   Appearance/psychiatric: To be in no distress  Constitutional: The patient is well nourished.  Cardiovascular: She does not have edema.  Respiratory: Respiratory effort is normal.  Gastrointestinal: Abdomen is  soft, gravid, nontender, no rashes, heart tones are present, fundal height is size equals dates    Pelvic Exam: No    Urine glucose/protein: See prenatal flowsheet       Assessment and Plan    Problem List Items Addressed This Visit          Gravid and     Elevated blood pressure affecting pregnancy in third trimester, antepartum    Overview     2024 - labs wnl UPCR 0.09          Rh negative, antepartum    Overview     2024 received 2024          Supervision of normal first pregnancy, antepartum - Primary    Overview     PHAM finalized: 24 per estimated conception, confirmed by 18-week US    Optional testing NIPS,CF/SMA,AFP:  NIPS low risk XX, considering CF/SMA, AFP    COVID: Recommended 24  Flu: Recommended 24  Tdap: 2024 Rx     Anatomy US: 24 normal anatomy except for limited views of cranial structures due to positioning, posterior placenta,  g, 76%, AC 53%.  Follow up ordered   Follow up US: 3/29/24 normal anatomy, including cranial structures.  EFW 14.6%, AC 16.5%, follow up ordered  2024  (45) AC (41) ERIC 15.86 Vertex PP grade 1          Relevant Medications    doxylamine (UNISOM) 25 MG tablet    Other Relevant Orders    POC Urinalysis Dipstick (Completed)       Other    COVID-19 affecting pregnancy in third trimester    Overview     2024 continue weekly NST          Relevant Medications    aspirin 81 MG EC tablet    oseltamivir (TAMIFLU) 75 MG capsule     Other Visit Diagnoses       Gastroesophageal reflux disease without esophagitis        Relevant Medications    famotidine (Pepcid) 20 MG tablet    Pruritus        Relevant Medications    diphenhydrAMINE-zinc acetate (Benadryl Extra Strength) 2-0.1 % cream            Impression  Pregnancy at 33w6d  Fetal status reassuring.   Activity and Exercise discussed.    Plan  Benadryl cream to pharmacy for pruritus.  Discussed evaluation if having itching on palms of hands or soles of feet.  Acid  reflux -Pepcid to pharmacy  Preeclampsia precautions discussed.  Normotensive blood pressure in office today no protein in urine.   Return to office in 2 weeks      Patient was counseled to the following pregnancy precautions:  Decreased fetal movement, if concern for decreased fetal movement please perform fetal kick counts you are looking for 10 movements in 2 hours.  If concern for fetal movement and not meeting that criteria, please present to triage for evaluation.  Contractions occurring every 5 minutes for over an hour, lasting 30 to 60 seconds and progressively causing more discomfort, please seek medical attention to rule out labor  If you believe that your water is broken, place a sanitary pad.  If pad fills in short period of time i.e. less than 5 minutes, take off pad placed another pad.  If this is saturated please present for rule out rupture of membranes  Vaginal bleeding can be normal in pregnancy, this usually takes a form of spotting.  If having heavier bleeding like a menstrual period please present for evaluation; especially in light of severe abdominal pain this could represent a placental abruption.  Keep all scheduled appointments as recommended.        Hollis Thomson MD  06/04/2024

## 2024-06-05 DIAGNOSIS — O98.512 COVID-19 AFFECTING PREGNANCY IN SECOND TRIMESTER: Primary | ICD-10-CM

## 2024-06-05 DIAGNOSIS — U07.1 COVID-19 AFFECTING PREGNANCY IN THIRD TRIMESTER: ICD-10-CM

## 2024-06-05 DIAGNOSIS — O98.513 COVID-19 AFFECTING PREGNANCY IN THIRD TRIMESTER: ICD-10-CM

## 2024-06-05 DIAGNOSIS — U07.1 COVID-19 AFFECTING PREGNANCY IN SECOND TRIMESTER: Primary | ICD-10-CM

## 2024-06-05 RX ORDER — FAMOTIDINE 20 MG/1
TABLET, FILM COATED ORAL
Qty: 180 TABLET | OUTPATIENT
Start: 2024-06-05

## 2024-06-06 ENCOUNTER — HOSPITAL ENCOUNTER (OUTPATIENT)
Dept: LABOR AND DELIVERY | Facility: HOSPITAL | Age: 29
Discharge: HOME | End: 2024-06-06
Payer: COMMERCIAL

## 2024-06-06 ENCOUNTER — HOSPITAL ENCOUNTER (OUTPATIENT)
Facility: HOSPITAL | Age: 29
Discharge: HOME | End: 2024-06-06
Attending: OBSTETRICS & GYNECOLOGY | Admitting: OBSTETRICS & GYNECOLOGY
Payer: COMMERCIAL

## 2024-06-06 VITALS
OXYGEN SATURATION: 100 % | SYSTOLIC BLOOD PRESSURE: 118 MMHG | RESPIRATION RATE: 18 BRPM | DIASTOLIC BLOOD PRESSURE: 67 MMHG | HEART RATE: 84 BPM

## 2024-06-06 DIAGNOSIS — O98.513 COVID-19 AFFECTING PREGNANCY IN THIRD TRIMESTER: ICD-10-CM

## 2024-06-06 DIAGNOSIS — U07.1 COVID-19 AFFECTING PREGNANCY IN THIRD TRIMESTER: ICD-10-CM

## 2024-06-06 PROCEDURE — 59025 FETAL NON-STRESS TEST: CPT | Performed by: OBSTETRICS & GYNECOLOGY

## 2024-06-06 PROCEDURE — 59025 FETAL NON-STRESS TEST: CPT

## 2024-06-06 PROCEDURE — G0463 HOSPITAL OUTPT CLINIC VISIT: HCPCS

## 2024-06-06 NOTE — NON STRESS TEST
Obstetrical Non-stress Test Interpretation     Name:  Juanito Newberry  MRN: [id number]    28 y.o. female  at 34w1d    Indication: NST/HX OF COVID      Fetal Assessment  Fetal Movement: active  Fetal HR Assessment Method: external  Fetal HR (beats/min): 125  Fetal HR Baseline: normal range  Fetal HR Variability: moderate (amplitude range 6 to 25 bpm)  Fetal HR Accelerations: lasting at least 15 seconds  Fetal HR Decelerations: absent    /67 (BP Location: Right arm)   Pulse 84   Resp 18   LMP  (LMP Unknown) Comment: pt. think her last period was about 2 mo. hasn't been sexually active for 2 mo. doesn't know if she could be pregnant because she hasn't checked  SpO2 100%     Reason for test: OB Triage (NST/HX OF COVID)  Date of Test: 2024  Time frame of test: 3142-4122  RN NST Interpretation: Reactive      Danni Cortez RN  2024  10:45 EDT

## 2024-06-07 ENCOUNTER — TELEPHONE (OUTPATIENT)
Dept: OBSTETRICS AND GYNECOLOGY | Facility: CLINIC | Age: 29
End: 2024-06-07

## 2024-06-07 NOTE — TELEPHONE ENCOUNTER
Caller: Juanito Newberry    Relationship: Self    Best call back number: [phone]      Who are you requesting to speak with (clinical staff, DR. PEOPLES      What was the call regarding: PATIENT IS ADVISING THAT SHE WOULD LIKE THE APPTS ON  6/26,  7/3, 7/10 CHANGED TO THURSDAYS,  SHE BABY SITS FOR HER SISTER. PLEASE    Is it okay if the provider responds through MyChart: YES

## 2024-06-09 ENCOUNTER — HOSPITAL ENCOUNTER (EMERGENCY)
Facility: HOSPITAL | Age: 29
Discharge: HOME | End: 2024-06-09
Attending: EMERGENCY MEDICINE | Admitting: EMERGENCY MEDICINE
Payer: COMMERCIAL

## 2024-06-09 VITALS
DIASTOLIC BLOOD PRESSURE: 70 MMHG | RESPIRATION RATE: 20 BRPM | OXYGEN SATURATION: 98 % | SYSTOLIC BLOOD PRESSURE: 124 MMHG | HEART RATE: 84 BPM | TEMPERATURE: 97.9 F | WEIGHT: 175.93 LBS | BODY MASS INDEX: 29.31 KG/M2 | HEIGHT: 65 IN

## 2024-06-09 DIAGNOSIS — R60.0 PEDAL EDEMA: ICD-10-CM

## 2024-06-09 DIAGNOSIS — R51.9 HEADACHE, UNSPECIFIED HEADACHE TYPE: Primary | ICD-10-CM

## 2024-06-09 LAB
ALBUMIN SERPL-MCNC: 3.6 G/DL (ref 3.5–5.2)
ALBUMIN/GLOB SERPL: 1.4 G/DL
ALP SERPL-CCNC: 94 U/L (ref 39–117)
ALT SERPL W P-5'-P-CCNC: 10 U/L (ref 1–33)
ANION GAP SERPL CALCULATED.3IONS-SCNC: 11.8 MMOL/L (ref 5–15)
AST SERPL-CCNC: 12 U/L (ref 1–32)
BASOPHILS # BLD AUTO: 0.03 10*3/MM3 (ref 0–0.2)
BASOPHILS NFR BLD AUTO: 0.3 % (ref 0–1.5)
BILIRUB SERPL-MCNC: 0.2 MG/DL (ref 0–1.2)
BILIRUB UR QL STRIP: NEGATIVE
BUN SERPL-MCNC: 5 MG/DL (ref 6–20)
BUN/CREAT SERPL: 9.4 (ref 7–25)
CALCIUM SPEC-SCNC: 8.6 MG/DL (ref 8.6–10.5)
CHLORIDE SERPL-SCNC: 104 MMOL/L (ref 98–107)
CLARITY UR: CLEAR
CO2 SERPL-SCNC: 20.2 MMOL/L (ref 22–29)
COLOR UR: YELLOW
CREAT SERPL-MCNC: 0.53 MG/DL (ref 0.57–1)
DEPRECATED RDW RBC AUTO: 38.9 FL (ref 37–54)
EGFRCR SERPLBLD CKD-EPI 2021: 130.2 ML/MIN/1.73
EOSINOPHIL # BLD AUTO: 0.15 10*3/MM3 (ref 0–0.4)
EOSINOPHIL NFR BLD AUTO: 1.6 % (ref 0.3–6.2)
ERYTHROCYTE [DISTWIDTH] IN BLOOD BY AUTOMATED COUNT: 13.3 % (ref 12.3–15.4)
GLOBULIN UR ELPH-MCNC: 2.6 GM/DL
GLUCOSE SERPL-MCNC: 76 MG/DL (ref 65–99)
GLUCOSE UR STRIP-MCNC: NEGATIVE MG/DL
HCT VFR BLD AUTO: 30.9 % (ref 34–46.6)
HGB BLD-MCNC: 10 G/DL (ref 12–15.9)
HGB UR QL STRIP.AUTO: NEGATIVE
IMM GRANULOCYTES # BLD AUTO: 0.07 10*3/MM3 (ref 0–0.05)
IMM GRANULOCYTES NFR BLD AUTO: 0.8 % (ref 0–0.5)
KETONES UR QL STRIP: ABNORMAL
LEUKOCYTE ESTERASE UR QL STRIP.AUTO: NEGATIVE
LYMPHOCYTES # BLD AUTO: 2.32 10*3/MM3 (ref 0.7–3.1)
LYMPHOCYTES NFR BLD AUTO: 25.5 % (ref 19.6–45.3)
MCH RBC QN AUTO: 26.1 PG (ref 26.6–33)
MCHC RBC AUTO-ENTMCNC: 32.4 G/DL (ref 31.5–35.7)
MCV RBC AUTO: 80.7 FL (ref 79–97)
MONOCYTES # BLD AUTO: 1.02 10*3/MM3 (ref 0.1–0.9)
MONOCYTES NFR BLD AUTO: 11.2 % (ref 5–12)
NEUTROPHILS NFR BLD AUTO: 5.52 10*3/MM3 (ref 1.7–7)
NEUTROPHILS NFR BLD AUTO: 60.6 % (ref 42.7–76)
NITRITE UR QL STRIP: NEGATIVE
NRBC BLD AUTO-RTO: 0 /100 WBC (ref 0–0.2)
PH UR STRIP.AUTO: 6.5 [PH] (ref 5–8)
PLATELET # BLD AUTO: 221 10*3/MM3 (ref 140–450)
PMV BLD AUTO: 10.1 FL (ref 6–12)
POTASSIUM SERPL-SCNC: 3.9 MMOL/L (ref 3.5–5.2)
PROT SERPL-MCNC: 6.2 G/DL (ref 6–8.5)
PROT UR QL STRIP: NEGATIVE
RBC # BLD AUTO: 3.83 10*6/MM3 (ref 3.77–5.28)
SODIUM SERPL-SCNC: 136 MMOL/L (ref 136–145)
SP GR UR STRIP: 1.02 (ref 1–1.03)
UROBILINOGEN UR QL STRIP: ABNORMAL
WBC NRBC COR # BLD AUTO: 9.11 10*3/MM3 (ref 3.4–10.8)

## 2024-06-09 PROCEDURE — 80053 COMPREHEN METABOLIC PANEL: CPT

## 2024-06-09 PROCEDURE — 81003 URINALYSIS AUTO W/O SCOPE: CPT

## 2024-06-09 PROCEDURE — 85025 COMPLETE CBC W/AUTO DIFF WBC: CPT

## 2024-06-09 PROCEDURE — 99283 EMERGENCY DEPT VISIT LOW MDM: CPT

## 2024-06-09 PROCEDURE — 36415 COLL VENOUS BLD VENIPUNCTURE: CPT

## 2024-06-09 RX ORDER — ACETAMINOPHEN 500 MG
1000 TABLET ORAL ONCE
Status: COMPLETED | OUTPATIENT
Start: 2024-06-09 | End: 2024-06-09

## 2024-06-09 RX ADMIN — ACETAMINOPHEN 1000 MG: 500 TABLET ORAL at 15:09

## 2024-06-09 NOTE — ED PROVIDER NOTES
Time: 2:58 PM EDT  Date of encounter:  6/9/2024  Independent Historian/Clinical History and Information was obtained by:   Patient    History is limited by: N/A    Chief Complaint   Patient presents with    Earache    Headache         History of Present Illness:  Patient is a G1, P0 28 y.o. year old female who presents to the emergency department for evaluation of bilateral lower extremity swelling and headache.  She is currently 35 weeks pregnant.  patient states she has been having lower extremity swelling for the past 4 days.  She states she started having a headache in the frontal area this morning.  States that it is causing her to have spots in her vision.  Patient states that she came here to get her blood pressure checked.  She states that she gets weekly stress test and her next one is scheduled for this Thursday.  She has not taken any analgesics PTA.  She takes a daily aspirin.  Patient states she is also having left ear pain for the past 2 days and wants to make sure she does not have an ear infection.  She denies dysuria and hematuria.    Patient Care Team  Primary Care Provider: Provider, No Known    Past Medical History:     No Known Allergies  No past medical history on file.  No past surgical history on file.  Family History   Problem Relation Age of Onset    Diabetes Paternal Grandmother     Depression Neg Hx     COPD Neg Hx     Cancer Neg Hx     Bleeding Disorder Neg Hx     Birth defects Neg Hx     Asthma Neg Hx     Arthritis Neg Hx     Alcohol abuse Neg Hx     Drug abuse Neg Hx     Early death Neg Hx     Hearing loss Neg Hx     Heart disease Neg Hx     Hyperlipidemia Neg Hx     Hypertension Neg Hx     Kidney disease Neg Hx     Learning disabilities Neg Hx     Malig Hyperthermia Neg Hx     Mental illness Neg Hx     Mental retardation Neg Hx     Miscarriages / Stillbirths Neg Hx        Home Medications:  Prior to Admission medications    Medication Sig Start Date End Date Taking? Authorizing  "Provider   aspirin 81 MG EC tablet Take 1 tablet by mouth Daily. 3/13/24   Lee Solorzano MD   diphenhydrAMINE-zinc acetate (Benadryl Extra Strength) 2-0.1 % cream Apply 1 Application topically to the appropriate area as directed 3 (Three) Times a Day As Needed for Itching. 6/4/24   Hollis Thomson MD   doxylamine (UNISOM) 25 MG tablet Take 0.5 tablets by mouth At Night As Needed for Nausea or Sleep (or anxiety). 4/24/24   Hollis Thomson MD   famotidine (Pepcid) 20 MG tablet Take 1 tablet by mouth 2 (Two) Times a Day As Needed for Indigestion or Heartburn. 6/4/24   Hollis Thomson MD   prenatal vitamin (prenatal, CLASSIC, vitamin) tablet Take  by mouth Daily.    ProviderYemi MD   oseltamivir (TAMIFLU) 75 MG capsule Take 1 capsule by mouth 2 (Two) Times a Day. 3/13/24 6/9/24  Mayito Mccall DO        Social History:   Social History     Tobacco Use    Smoking status: Never     Passive exposure: Current    Smokeless tobacco: Never   Vaping Use    Vaping status: Former   Substance Use Topics    Alcohol use: Not Currently     Comment: \"social drinker\"    Drug use: Not Currently     Types: Marijuana     Comment: last use 2 mo. ago         Review of Systems:  Review of Systems   Constitutional: Negative.    HENT: Negative.  Positive for ear pain. Negative for ear discharge.    Eyes: Negative.  Positive for visual disturbance.   Respiratory: Negative.     Cardiovascular: Negative.  Positive for leg swelling.   Gastrointestinal: Negative.    Endocrine: Negative.    Genitourinary: Negative.    Musculoskeletal: Negative.    Skin: Negative.    Allergic/Immunologic: Negative.    Neurological: Negative.  Positive for headaches.   Hematological: Negative.    Psychiatric/Behavioral: Negative.          Physical Exam:  /78   Pulse 97   Temp 98 °F (36.7 °C) (Oral)   Resp 16   Ht 165.1 cm (65\")   Wt 79.8 kg (175 lb 14.8 oz)   LMP  (LMP Unknown) Comment: pt. think her last period was about 2 mo. " hasn't been sexually active for 2 mo. doesn't know if she could be pregnant because she hasn't checked  SpO2 100%   Breastfeeding Unknown   BMI 29.28 kg/m²         Physical Exam  Vitals and nursing note reviewed.   Constitutional:       Appearance: Normal appearance.   HENT:      Head: Normocephalic and atraumatic.      Right Ear: Hearing and tympanic membrane normal. No drainage or tenderness. No mastoid tenderness. Tympanic membrane is not erythematous.      Left Ear: Hearing and tympanic membrane normal. No drainage or tenderness. No mastoid tenderness. Tympanic membrane is not erythematous.      Nose: Nose normal.      Mouth/Throat:      Mouth: Mucous membranes are moist.   Eyes:      Extraocular Movements: Extraocular movements intact.      Right eye: Normal extraocular motion and no nystagmus.      Left eye: Normal extraocular motion and no nystagmus.      Conjunctiva/sclera: Conjunctivae normal.      Pupils: Pupils are equal, round, and reactive to light.   Cardiovascular:      Rate and Rhythm: Normal rate and regular rhythm.      Heart sounds: Normal heart sounds.   Pulmonary:      Effort: Pulmonary effort is normal.      Breath sounds: Normal breath sounds.   Musculoskeletal:         General: Swelling present. Normal range of motion.      Cervical back: Normal range of motion and neck supple.      Right lower leg: No swelling. No edema.      Left lower leg: No swelling. No edema.      Right foot: Swelling present. Normal pulse.      Left foot: Swelling present. Normal pulse.   Skin:     General: Skin is warm and dry.   Neurological:      General: No focal deficit present.      Mental Status: She is alert and oriented to person, place, and time.   Psychiatric:         Mood and Affect: Mood normal.         Behavior: Behavior normal.                Procedures:  Procedures      Medical Decision Making:      Comorbidities that affect care:    Pregnancy    External Notes reviewed:    Telephone Encounter: With  OB/GYN 6/7/2024      The following orders were placed and all results were independently analyzed by me:  Orders Placed This Encounter   Procedures    Comprehensive Metabolic Panel    Urinalysis With Microscopic If Indicated (No Culture) - Urine, Clean Catch    CBC Auto Differential    CBC & Differential       Medications Given in the Emergency Department:  Medications   acetaminophen (TYLENOL) tablet 1,000 mg (1,000 mg Oral Given 6/9/24 1509)        ED Course:    The patient was initially evaluated in the triage area where orders were placed. The patient was later dispositioned by Stephanie Hurt PA-C.      The patient was advised to stay for completion of workup which includes but is not limited to communication of labs and radiological results, reassessment and plan. The patient was advised that leaving prior to disposition by a provider could result in critical findings that are not communicated to the patient.     ED Course as of 06/09/24 1555   Sun Jun 09, 2024   1553 Patient states her headache is improving. [AJ]      ED Course User Index  [AJ] Stephanie Hurt PA-C       Labs:    Lab Results (last 24 hours)       Procedure Component Value Units Date/Time    Comprehensive Metabolic Panel [[id number]]  (Abnormal) Collected: 06/09/24 1511    Specimen: Blood Updated: 06/09/24 1535     Glucose 76 mg/dL      BUN 5 mg/dL      Creatinine 0.53 mg/dL      Sodium 136 mmol/L      Potassium 3.9 mmol/L      Chloride 104 mmol/L      CO2 20.2 mmol/L      Calcium 8.6 mg/dL      Total Protein 6.2 g/dL      Albumin 3.6 g/dL      ALT (SGPT) 10 U/L      AST (SGOT) 12 U/L      Alkaline Phosphatase 94 U/L      Total Bilirubin 0.2 mg/dL      Globulin 2.6 gm/dL      A/G Ratio 1.4 g/dL      BUN/Creatinine Ratio 9.4     Anion Gap 11.8 mmol/L      eGFR 130.2 mL/min/1.73     Narrative:      GFR Normal >60  Chronic Kidney Disease <60  Kidney Failure <15      CBC & Differential [[id number]]  (Abnormal) Collected: 06/09/24 1511     Specimen: Blood Updated: 06/09/24 1518    Narrative:      The following orders were created for panel order CBC & Differential.  Procedure                               Abnormality         Status                     ---------                               -----------         ------                     CBC Auto Differential[[id number]]        Abnormal            Final result                 Please view results for these tests on the individual orders.    Urinalysis With Microscopic If Indicated (No Culture) - Urine, Clean Catch [[id number]]  (Abnormal) Collected: 06/09/24 1511    Specimen: Urine, Clean Catch Updated: 06/09/24 1521     Color, UA Yellow     Appearance, UA Clear     pH, UA 6.5     Specific Gravity, UA 1.023     Glucose, UA Negative     Ketones, UA Trace     Bilirubin, UA Negative     Blood, UA Negative     Protein, UA Negative     Leuk Esterase, UA Negative     Nitrite, UA Negative     Urobilinogen, UA 1.0 E.U./dL    Narrative:      Urine microscopic not indicated.    CBC Auto Differential [[id number]]  (Abnormal) Collected: 06/09/24 1511    Specimen: Blood Updated: 06/09/24 1518     WBC 9.11 10*3/mm3      RBC 3.83 10*6/mm3      Hemoglobin 10.0 g/dL      Hematocrit 30.9 %      MCV 80.7 fL      MCH 26.1 pg      MCHC 32.4 g/dL      RDW 13.3 %      RDW-SD 38.9 fl      MPV 10.1 fL      Platelets 221 10*3/mm3      Neutrophil % 60.6 %      Lymphocyte % 25.5 %      Monocyte % 11.2 %      Eosinophil % 1.6 %      Basophil % 0.3 %      Immature Grans % 0.8 %      Neutrophils, Absolute 5.52 10*3/mm3      Lymphocytes, Absolute 2.32 10*3/mm3      Monocytes, Absolute 1.02 10*3/mm3      Eosinophils, Absolute 0.15 10*3/mm3      Basophils, Absolute 0.03 10*3/mm3      Immature Grans, Absolute 0.07 10*3/mm3      nRBC 0.0 /100 WBC              Imaging:    No Radiology Exams Resulted Within Past 24 Hours      Differential Diagnosis and Discussion:      Ear Pain: Differential diagnosis includes but is not limited to this  externa, otitis media, foreign body, bullous myringitis, furuncles, herpes zoster, mastoiditis, trauma, and tumors  Edema: Based on the patient's history, signs, and symptoms, the differential diagnosis includes but is not limited to heart failure, kidney disease, liver disease, venous insufficiency, lymphedema, pregnancy, medications, allergic reactions.  Headache: Differential diagnosis includes but is not limited to migraine, cluster headache, hypertension, tumor, subarachnoid bleeding, pseudotumor cerebri, temporal arteritis, infections, tension headache, and TMJ syndrome.    All labs were reviewed and interpreted by me.    MDM     Amount and/or Complexity of Data Reviewed  Clinical lab tests: reviewed                 Patient Care Considerations:    ANTIBIOTICS: I considered prescribing antibiotics as an outpatient however no bacterial focus of infection was found.      Consultants/Shared Management Plan:    None    Social Determinants of Health:    Patient is independent, reliable, and has access to care.       Disposition and Care Coordination:    Discharged: The patient is suitable and stable for discharge with no need for consideration of admission.    I have explained the patient´s condition, diagnoses and treatment plan based on the information available to me at this time. I have answered questions and addressed any concerns. The patient has a good  understanding of the patient´s diagnosis, condition, and treatment plan as can be expected at this point. The vital signs have been stable. The patient´s condition is stable and appropriate for discharge from the emergency department.      The patient will pursue further outpatient evaluation with the primary care physician or other designated or consulting physician as outlined in the discharge instructions. They are agreeable to this plan of care and follow-up instructions have been explained in detail. The patient has received these instructions in written  format and has expressed an understanding of the discharge instructions. The patient is aware that any significant change in condition or worsening of symptoms should prompt an immediate return to this or the closest emergency department or call to 911.    Final diagnoses:   Headache, unspecified headache type   Pedal edema        ED Disposition       ED Disposition   Discharge    Condition   Stable    Comment   --               This medical record created using voice recognition software.             Stephanie Hurt PA-C  06/09/24 [id number]

## 2024-06-09 NOTE — DISCHARGE INSTRUCTIONS
All lab work showing no concerning abnormality  Urine was negative  He can take Tylenol for headache as needed  Please follow-up with your OB/GYN as scheduled

## 2024-06-13 ENCOUNTER — HOSPITAL ENCOUNTER (OUTPATIENT)
Facility: HOSPITAL | Age: 29
Discharge: HOME | End: 2024-06-13
Attending: OBSTETRICS & GYNECOLOGY | Admitting: OBSTETRICS & GYNECOLOGY
Payer: COMMERCIAL

## 2024-06-13 ENCOUNTER — HOSPITAL ENCOUNTER (OUTPATIENT)
Dept: LABOR AND DELIVERY | Facility: HOSPITAL | Age: 29
Discharge: HOME | End: 2024-06-13
Payer: COMMERCIAL

## 2024-06-13 VITALS — HEART RATE: 87 BPM | DIASTOLIC BLOOD PRESSURE: 72 MMHG | SYSTOLIC BLOOD PRESSURE: 121 MMHG

## 2024-06-13 DIAGNOSIS — U07.1 COVID-19 AFFECTING PREGNANCY IN THIRD TRIMESTER: Primary | ICD-10-CM

## 2024-06-13 DIAGNOSIS — U07.1 COVID-19 AFFECTING PREGNANCY IN THIRD TRIMESTER: ICD-10-CM

## 2024-06-13 DIAGNOSIS — O98.513 COVID-19 AFFECTING PREGNANCY IN THIRD TRIMESTER: ICD-10-CM

## 2024-06-13 DIAGNOSIS — O98.513 COVID-19 AFFECTING PREGNANCY IN THIRD TRIMESTER: Primary | ICD-10-CM

## 2024-06-13 PROCEDURE — 59025 FETAL NON-STRESS TEST: CPT

## 2024-06-13 NOTE — NON STRESS TEST
Obstetrical Non-stress Test Interpretation     Name:  Juanito Newberry  MRN: [id number]    28 y.o. female  at 35w1d    Indication: Hx of COVID      Fetal Assessment  Fetal Movement: active  Fetal HR Assessment Method: external  Fetal HR (beats/min): 140  Fetal HR Baseline: normal range  Fetal HR Variability: moderate (amplitude range 6 to 25 bpm)  Fetal HR Accelerations: greater than/equal to 15 bpm, lasting at least 15 seconds  Fetal HR Decelerations: absent  Sinusoidal Pattern Present: absent  Fetal HR Tracing Category: Category I    /72   Pulse 87   LMP  (LMP Unknown) Comment: pt. think her last period was about 2 mo. hasn't been sexually active for 2 mo. doesn't know if she could be pregnant because she hasn't checked    Reason for test: Other (Comment) (HX of COVID)  Date of Test: 2024  Time frame of test: 7276-9792  RN NST Interpretation: Reactive      Sagrario Mancini RN  2024  10:35 EDT

## 2024-06-13 NOTE — NON STRESS TEST
Obstetrical Non-stress Test Interpretation     Name:  Juanito Newberry  MRN: [id number]    28 y.o. female  at 35w1d    Indication: History of COVID infection      Baseline: Normal 110-160 bpm  Variability:   Moderate/Normal (amplitude 6-25 bpm)  Accelerations: Present (32 weeks+) 15 x 15 bpm  Decelerations: Absent   Contractions:  Rare      Impression:  Category I       Brisa Ashton MD  2024  15:30 EDT

## 2024-06-20 ENCOUNTER — HOSPITAL ENCOUNTER (OUTPATIENT)
Dept: LABOR AND DELIVERY | Facility: HOSPITAL | Age: 29
Discharge: HOME | End: 2024-06-20
Payer: COMMERCIAL

## 2024-06-20 ENCOUNTER — ROUTINE PRENATAL (OUTPATIENT)
Dept: OBSTETRICS AND GYNECOLOGY | Facility: CLINIC | Age: 29
End: 2024-06-20
Payer: COMMERCIAL

## 2024-06-20 ENCOUNTER — HOSPITAL ENCOUNTER (OUTPATIENT)
Facility: HOSPITAL | Age: 29
Discharge: HOME | End: 2024-06-20
Attending: STUDENT IN AN ORGANIZED HEALTH CARE EDUCATION/TRAINING PROGRAM | Admitting: STUDENT IN AN ORGANIZED HEALTH CARE EDUCATION/TRAINING PROGRAM
Payer: COMMERCIAL

## 2024-06-20 VITALS — BODY MASS INDEX: 28.79 KG/M2 | DIASTOLIC BLOOD PRESSURE: 80 MMHG | WEIGHT: 173 LBS | SYSTOLIC BLOOD PRESSURE: 124 MMHG

## 2024-06-20 VITALS — DIASTOLIC BLOOD PRESSURE: 75 MMHG | SYSTOLIC BLOOD PRESSURE: 126 MMHG | HEART RATE: 96 BPM | OXYGEN SATURATION: 99 %

## 2024-06-20 DIAGNOSIS — Z67.91 RH NEGATIVE, ANTEPARTUM: ICD-10-CM

## 2024-06-20 DIAGNOSIS — O16.3 ELEVATED BLOOD PRESSURE AFFECTING PREGNANCY IN THIRD TRIMESTER, ANTEPARTUM: ICD-10-CM

## 2024-06-20 DIAGNOSIS — O26.899 RH NEGATIVE, ANTEPARTUM: ICD-10-CM

## 2024-06-20 DIAGNOSIS — O98.513 COVID-19 AFFECTING PREGNANCY IN THIRD TRIMESTER: ICD-10-CM

## 2024-06-20 DIAGNOSIS — Z34.00 SUPERVISION OF NORMAL FIRST PREGNANCY, ANTEPARTUM: Primary | ICD-10-CM

## 2024-06-20 DIAGNOSIS — U07.1 COVID-19 AFFECTING PREGNANCY IN THIRD TRIMESTER: ICD-10-CM

## 2024-06-20 LAB
GLUCOSE UR STRIP-MCNC: NEGATIVE MG/DL
PROT UR STRIP-MCNC: NEGATIVE MG/DL

## 2024-06-20 PROCEDURE — 59025 FETAL NON-STRESS TEST: CPT | Performed by: STUDENT IN AN ORGANIZED HEALTH CARE EDUCATION/TRAINING PROGRAM

## 2024-06-20 PROCEDURE — 87653 STREP B DNA AMP PROBE: CPT | Performed by: STUDENT IN AN ORGANIZED HEALTH CARE EDUCATION/TRAINING PROGRAM

## 2024-06-20 PROCEDURE — 59020 FETAL CONTRACT STRESS TEST: CPT

## 2024-06-20 PROCEDURE — 59025 FETAL NON-STRESS TEST: CPT

## 2024-06-20 NOTE — PROGRESS NOTES
OB FOLLOW UP  Complaint   Chief Complaint   Patient presents with    Routine Prenatal Visit            Juanito Newberry is a 29 y.o.  36w1d patient being seen today for her obstetrical follow up visit. Patient denies decreased fetal movement, contractions, loss of fluid or vaginal bleeding. Patient denies any headache, visual disturbances, new onset nausea vomiting, right upper quadrant pain, or new onset swelling.      Her prenatal care is complicated by (and status) :    Patient Active Problem List   Diagnosis    Supervision of normal first pregnancy, antepartum    Rh negative, antepartum    COVID-19 affecting pregnancy in third trimester    Elevated blood pressure affecting pregnancy in third trimester, antepartum       All other systems reviewed and are negative.     The additional following portions of the patient's history were reviewed and updated as appropriate: allergies, current medications, past family history, past medical history, past social history, past surgical history, and problem list.      EXAM:     Vital signs: /80   Wt 78.5 kg (173 lb)   LMP  (LMP Unknown) Comment: pt. think her last period was about 2 mo. hasn't been sexually active for 2 mo. doesn't know if she could be pregnant because she hasn't checked  BMI 28.79 kg/m²   Appearance/psychiatric: To be in no distress  Constitutional: The patient is well nourished.  Cardiovascular: She does not have edema.  Respiratory: Respiratory effort is normal.  Gastrointestinal: Abdomen is soft, gravid, nontender, no rashes, heart tones are present, fundal height is size equals dates    Pelvic Exam: Yes.  Presentation: cephalic. Dilation: Closed. Effacement: 40. Station: -3.    Urine glucose/protein: See prenatal flowsheet       Assessment and Plan    Problem List Items Addressed This Visit          Gravid and     Elevated blood pressure affecting pregnancy in third trimester, antepartum    Overview     2024 - labs wnl UPCR  0.09          Rh negative, antepartum    Overview     6/4/2024 received 4/25/2024          Supervision of normal first pregnancy, antepartum - Primary    Overview     PHAM finalized: 7/17/24 per estimated conception, confirmed by 18-week US    Optional testing NIPS,CF/SMA,AFP:  NIPS low risk XX, considering CF/SMA, AFP    COVID: Recommended 1/31/24  Flu: Recommended 1/31/24  Tdap: 4/24/2024 Rx     Anatomy US: 2/28/24 normal anatomy except for limited views of cranial structures due to positioning, posterior placenta,  g, 76%, AC 53%.  Follow up ordered   Follow up US: 3/29/24 normal anatomy, including cranial structures.  EFW 14.6%, AC 16.5%, follow up ordered  4/11/2024  (45) AC (41) ERIC 15.86 Vertex PP grade 1          Relevant Medications    doxylamine (UNISOM) 25 MG tablet    Other Relevant Orders    POC Urinalysis Dipstick (Completed)       Other    COVID-19 affecting pregnancy in third trimester    Overview     6/4/2024 continue weekly NST          Relevant Medications    aspirin 81 MG EC tablet       Impression  Pregnancy at 36w1d  Fetal status reassuring.   Activity and Exercise discussed.    Plan  Continue with weekly NST for history of COVID, baby aspirin daily.  History of elevated blood pressure without diagnosis of hypertension.  Normotensive blood pressure today no glucose or protein in urine.  Preeclampsia precautions discussed.  Arrive trial discussed with patient in regards to elective induction of labor versus spontaneous labor.  Patient to have decision over the next several weeks.  GBS swab collected today  Return to office in 1 week      Patient was counseled to the following pregnancy precautions:  Decreased fetal movement, if concern for decreased fetal movement please perform fetal kick counts you are looking for 10 movements in 2 hours.  If concern for fetal movement and not meeting that criteria, please present to triage for evaluation.  Contractions occurring every 5 minutes  for over an hour, lasting 30 to 60 seconds and progressively causing more discomfort, please seek medical attention to rule out labor  If you believe that your water is broken, place a sanitary pad.  If pad fills in short period of time i.e. less than 5 minutes, take off pad placed another pad.  If this is saturated please present for rule out rupture of membranes  Vaginal bleeding can be normal in pregnancy, this usually takes a form of spotting.  If having heavier bleeding like a menstrual period please present for evaluation; especially in light of severe abdominal pain this could represent a placental abruption.  Keep all scheduled appointments as recommended.        Hollis Thomson MD  06/20/2024

## 2024-06-20 NOTE — NON STRESS TEST
Obstetrical Non-stress Test Interpretation     Name:  Juanito Newberry  MRN: [id number]    29 y.o. female  at 36w1d    Indication: NST- Hx Covid      Fetal Assessment  Fetal Movement: active  Fetal HR Assessment Method: external  Fetal HR (beats/min): 135  Fetal HR Baseline: normal range  Fetal HR Variability: moderate (amplitude range 6 to 25 bpm)  Fetal HR Accelerations: episodic, greater than/equal to 15 bpm, lasting at least 15 seconds  Fetal HR Decelerations: absent  Sinusoidal Pattern Present: absent  Fetal HR Tracing Category: Category I    /75   Pulse 96   LMP  (LMP Unknown) Comment: pt. think her last period was about 2 mo. hasn't been sexually active for 2 mo. doesn't know if she could be pregnant because she hasn't checked  SpO2 99%     Reason for test: OB Triage (NST hx covid)  Date of Test: 2024  Time frame of test: 8750-5750  RN NST Interpretation: Reactive      Jennifer Mccullough RN  2024  11:25 EDT

## 2024-06-21 LAB — GROUP B STREP, DNA: NEGATIVE

## 2024-06-27 ENCOUNTER — HOSPITAL ENCOUNTER (OUTPATIENT)
Dept: LABOR AND DELIVERY | Facility: HOSPITAL | Age: 29
Discharge: HOME | End: 2024-06-27
Payer: COMMERCIAL

## 2024-06-27 ENCOUNTER — HOSPITAL ENCOUNTER (OUTPATIENT)
Facility: HOSPITAL | Age: 29
Discharge: HOME | End: 2024-06-27
Attending: OBSTETRICS & GYNECOLOGY | Admitting: OBSTETRICS & GYNECOLOGY
Payer: COMMERCIAL

## 2024-06-27 ENCOUNTER — ROUTINE PRENATAL (OUTPATIENT)
Dept: OBSTETRICS AND GYNECOLOGY | Facility: CLINIC | Age: 29
End: 2024-06-27
Payer: COMMERCIAL

## 2024-06-27 VITALS
OXYGEN SATURATION: 99 % | SYSTOLIC BLOOD PRESSURE: 123 MMHG | DIASTOLIC BLOOD PRESSURE: 69 MMHG | HEART RATE: 99 BPM | RESPIRATION RATE: 18 BRPM

## 2024-06-27 VITALS — SYSTOLIC BLOOD PRESSURE: 126 MMHG | BODY MASS INDEX: 29.45 KG/M2 | DIASTOLIC BLOOD PRESSURE: 80 MMHG | WEIGHT: 177 LBS

## 2024-06-27 DIAGNOSIS — O98.513 COVID-19 AFFECTING PREGNANCY IN THIRD TRIMESTER: ICD-10-CM

## 2024-06-27 DIAGNOSIS — Z67.91 RH NEGATIVE, ANTEPARTUM: ICD-10-CM

## 2024-06-27 DIAGNOSIS — O26.899 RH NEGATIVE, ANTEPARTUM: ICD-10-CM

## 2024-06-27 DIAGNOSIS — Z34.00 SUPERVISION OF NORMAL FIRST PREGNANCY, ANTEPARTUM: Primary | ICD-10-CM

## 2024-06-27 DIAGNOSIS — U07.1 COVID-19 AFFECTING PREGNANCY IN THIRD TRIMESTER: ICD-10-CM

## 2024-06-27 DIAGNOSIS — O16.3 ELEVATED BLOOD PRESSURE AFFECTING PREGNANCY IN THIRD TRIMESTER, ANTEPARTUM: ICD-10-CM

## 2024-06-27 LAB
GLUCOSE UR STRIP-MCNC: NEGATIVE MG/DL
PROT UR STRIP-MCNC: NEGATIVE MG/DL

## 2024-06-27 PROCEDURE — 59025 FETAL NON-STRESS TEST: CPT

## 2024-06-27 PROCEDURE — 59025 FETAL NON-STRESS TEST: CPT | Performed by: OBSTETRICS & GYNECOLOGY

## 2024-06-27 NOTE — NON STRESS TEST
Obstetrical Non-stress Test Interpretation     Name:  Juanito Newberry  MRN: [id number]    29 y.o. female  at 37w1d    Indication: hx covid       Fetal Assessment  Fetal Movement: active  Fetal HR Assessment Method: external  Fetal HR (beats/min): 140  Fetal HR Baseline: normal range  Fetal HR Variability: moderate (amplitude range 6 to 25 bpm)  Fetal HR Accelerations: greater than/equal to 15 bpm, lasting at least 15 seconds  Fetal HR Decelerations: absent  Sinusoidal Pattern Present: absent    /69 (BP Location: Right arm)   Pulse 99   Resp 18   LMP  (LMP Unknown) Comment: pt. think her last period was about 2 mo. hasn't been sexually active for 2 mo. doesn't know if she could be pregnant because she hasn't checked  SpO2 99%     Reason for test:  (hx covid)  Date of Test: 2024  Time frame of test: 2763-7028  RN NST Interpretation: Reactive      Sapphire Bello RN  2024  11:38 EDT

## 2024-06-27 NOTE — PROGRESS NOTES
OB FOLLOW UP  Complaint   Chief Complaint   Patient presents with    Routine Prenatal Visit            Juanito Newberry is a 29 y.o.  37w1d patient being seen today for her obstetrical follow up visit. Patient denies decreased fetal movement, contractions, loss of fluid or vaginal bleeding.  Noting some right upper quadrant discomfort.  Takes Tylenol and this does help with discomfort.  Denies any nausea or vomiting.  No pain with urination.  No other acute complaints.  Desires elective induction of labor.    Her prenatal care is complicated by (and status) :    Patient Active Problem List   Diagnosis    Supervision of normal first pregnancy, antepartum    Rh negative, antepartum    COVID-19 affecting pregnancy in third trimester    Elevated blood pressure affecting pregnancy in third trimester, antepartum       All other systems reviewed and are negative.     The additional following portions of the patient's history were reviewed and updated as appropriate: allergies, current medications, past family history, past medical history, past social history, past surgical history, and problem list.      EXAM:     Vital signs: /80   Wt 80.3 kg (177 lb)   LMP  (LMP Unknown) Comment: pt. think her last period was about 2 mo. hasn't been sexually active for 2 mo. doesn't know if she could be pregnant because she hasn't checked  BMI 29.45 kg/m²   Appearance/psychiatric: To be in no distress  Constitutional: The patient is well nourished.  Cardiovascular: She does not have edema.  Respiratory: Respiratory effort is normal.  Gastrointestinal: Abdomen is soft, gravid, nontender, no rashes, heart tones are present, fundal height is size equals dates    Pelvic Exam:  Patient declined cervical examination    Urine glucose/protein: See prenatal flowsheet       Assessment and Plan    Problem List Items Addressed This Visit          Gravid and     Elevated blood pressure affecting pregnancy in third trimester,  antepartum    Overview     5/23/2024 - labs wnl UPCR 0.09          Rh negative, antepartum    Overview     6/4/2024 received 4/25/2024          Supervision of normal first pregnancy, antepartum - Primary    Overview     PHAM finalized: 7/17/24 per estimated conception, confirmed by 18-week US    Optional testing NIPS,CF/SMA,AFP:  NIPS low risk XX, considering CF/SMA, AFP    COVID: Recommended 1/31/24  Flu: Recommended 1/31/24  Tdap: 4/24/2024 Rx     Anatomy US: 2/28/24 normal anatomy except for limited views of cranial structures due to positioning, posterior placenta,  g, 76%, AC 53%.  Follow up ordered   Follow up US: 3/29/24 normal anatomy, including cranial structures.  EFW 14.6%, AC 16.5%, follow up ordered  4/11/2024  (45) AC (41) ERIC 15.86 Vertex PP grade 1          Relevant Medications    doxylamine (UNISOM) 25 MG tablet    Other Relevant Orders    POC Urinalysis Dipstick (Completed)       Other    COVID-19 affecting pregnancy in third trimester    Overview     6/4/2024 continue weekly NST          Relevant Medications    aspirin 81 MG EC tablet       Impression  Pregnancy at 37w1d  Fetal status reassuring.   Activity and Exercise discussed.    Plan  GBS swab negative  Induction of labor to be scheduled for July 15, 2024 at 39 weeks and 5 days.  Continue with weekly nonstress test for history of COVID  Return to office in 1 week      Patient was counseled to the following pregnancy precautions:  Decreased fetal movement, if concern for decreased fetal movement please perform fetal kick counts you are looking for 10 movements in 2 hours.  If concern for fetal movement and not meeting that criteria, please present to triage for evaluation.  Contractions occurring every 5 minutes for over an hour, lasting 30 to 60 seconds and progressively causing more discomfort, please seek medical attention to rule out labor  If you believe that your water is broken, place a sanitary pad.  If pad fills in short  period of time i.e. less than 5 minutes, take off pad placed another pad.  If this is saturated please present for rule out rupture of membranes  Vaginal bleeding can be normal in pregnancy, this usually takes a form of spotting.  If having heavier bleeding like a menstrual period please present for evaluation; especially in light of severe abdominal pain this could represent a placental abruption.  Keep all scheduled appointments as recommended.        Hollis Thomson MD  06/27/2024

## 2024-06-28 NOTE — NON STRESS TEST
[facility]   OB NST Note    2024   Name:  Juanito Newberry  MRN: [id number]    Subjective:  29 y.o.  at 37w1d    Indication: +COVID    NST:   Baseline: 140  Variability:   Moderate/Normal (amplitude 6-25 bpm)  Accelerations: Present (32 weeks+) 15 x 15 bpm  Decelerations: Absent   Contractions:  Irritability    NST interpretation: reactive    Documented Vitals    24 1133   BP: 123/69   Pulse: 99   Resp: 18   SpO2: 99%         Lab Results (last 24 hours)       Procedure Component Value Units Date/Time    POC Urinalysis Dipstick [[id number]] Collected: 24    Specimen: Urine Updated: 24 09     Glucose, UA Negative mg/dL      Protein, POC Negative mg/dL                Assessment:  29 y.o.  AT 37w1d  +COVID    Plan:   OB Precautions, [facility], Keep scheduled NSTs, Keep office visit      Electronically signed by Lee Solorzano MD, 24, 8:15 PM EDT.

## 2024-07-03 ENCOUNTER — HOSPITAL ENCOUNTER (OUTPATIENT)
Facility: HOSPITAL | Age: 29
Discharge: HOME | End: 2024-07-03
Attending: OBSTETRICS & GYNECOLOGY | Admitting: OBSTETRICS & GYNECOLOGY
Payer: COMMERCIAL

## 2024-07-03 ENCOUNTER — HOSPITAL ENCOUNTER (OUTPATIENT)
Dept: LABOR AND DELIVERY | Facility: HOSPITAL | Age: 29
Discharge: HOME | End: 2024-07-03
Payer: COMMERCIAL

## 2024-07-03 ENCOUNTER — PATIENT OUTREACH (OUTPATIENT)
Dept: LABOR AND DELIVERY | Facility: HOSPITAL | Age: 29
End: 2024-07-03
Payer: COMMERCIAL

## 2024-07-03 ENCOUNTER — ROUTINE PRENATAL (OUTPATIENT)
Dept: OBSTETRICS AND GYNECOLOGY | Facility: CLINIC | Age: 29
End: 2024-07-03
Payer: COMMERCIAL

## 2024-07-03 VITALS — DIASTOLIC BLOOD PRESSURE: 78 MMHG | WEIGHT: 177 LBS | SYSTOLIC BLOOD PRESSURE: 117 MMHG | BODY MASS INDEX: 29.45 KG/M2

## 2024-07-03 VITALS — DIASTOLIC BLOOD PRESSURE: 73 MMHG | HEART RATE: 120 BPM | SYSTOLIC BLOOD PRESSURE: 122 MMHG

## 2024-07-03 DIAGNOSIS — Z34.00 SUPERVISION OF NORMAL FIRST PREGNANCY, ANTEPARTUM: Primary | ICD-10-CM

## 2024-07-03 DIAGNOSIS — O26.899 RH NEGATIVE, ANTEPARTUM: ICD-10-CM

## 2024-07-03 DIAGNOSIS — O98.513 COVID-19 AFFECTING PREGNANCY IN THIRD TRIMESTER: ICD-10-CM

## 2024-07-03 DIAGNOSIS — U07.1 COVID-19 AFFECTING PREGNANCY IN THIRD TRIMESTER: ICD-10-CM

## 2024-07-03 DIAGNOSIS — Z67.91 RH NEGATIVE, ANTEPARTUM: ICD-10-CM

## 2024-07-03 LAB
GLUCOSE UR STRIP-MCNC: NEGATIVE MG/DL
PROT UR STRIP-MCNC: NEGATIVE MG/DL

## 2024-07-03 PROCEDURE — 59025 FETAL NON-STRESS TEST: CPT | Performed by: OBSTETRICS & GYNECOLOGY

## 2024-07-03 PROCEDURE — 59025 FETAL NON-STRESS TEST: CPT

## 2024-07-03 NOTE — NON STRESS TEST
Obstetrical Non-stress Test Interpretation     Name:  Juanito Newberry  MRN: [id number]    29 y.o. female  at 38w0d    Indication: covid during pregnancy      Fetal Movement: active  Fetal HR Assessment Method: external  Fetal HR (beats/min): 145  Fetal HR Baseline: normal range  Fetal HR Variability: moderate (amplitude range 6 to 25 bpm)  Fetal HR Accelerations: episodic, greater than/equal to 15 bpm, lasting at least 15 seconds  Fetal HR Decelerations: absent  Sinusoidal Pattern Present: absent    Blood pressure 122/73, Pulse 120      Reason for test: Other (Comment) (Covid during pregnancy)  Date of Test: 7/3/2024  Time frame of test: 6030-8247  RN NST Interpretation: Reactive      Ritika Delgado RN  7/3/2024  12:45 EDT

## 2024-07-03 NOTE — OUTREACH NOTE
[facility]  Check-In    Current Estimated Gestational Age: 38w0d      Questions/Answers      Flowsheet Row Responses   Best Method for Contacting Cell   Demographics Reviewed Yes   Currently Employed Yes   Able to keep appointments as scheduled Yes   Baby Active/Feeling Fetal Movemen Yes   How are you presently feeling? Tired   Questions regarding prenatal visits or tests to be ordered? No   Supplies ready for baby Breast Pump, Car Seat, Clothing, Crib, Diapers   Resource/Environmental Concerns None   Do you have any questions related to your care experience, your pregnancy, plans for delivery, any concerns, etc? No   Other Education Birth Plan            Pediatrician: Anupam  FOB: not involved - sister is support  Feeding Plan: breast feeding - has [employer] pump    No current concerns with food, housing or transportation.  Encouraged to reach out for any questions, needs or concerns.    Carmen Chu RN  Maternity Nurse Navigator    7/3/2024, 11:09 EDT

## 2024-07-03 NOTE — PROGRESS NOTES
OB FOLLOW UP  Complaint   Chief Complaint   Patient presents with    Routine Prenatal Visit            Juanito Newberry is a 29 y.o.  38w0d patient being seen today for her obstetrical follow up visit. Patient denies decreased fetal movement, contractions, loss of fluid or vaginal bleeding.  No acute complaints.  Compliant with nonstress test for history of COVID.  Compliant with baby aspirin and prenatal vitamin.    Her prenatal care is complicated by (and status) :    Patient Active Problem List   Diagnosis    Supervision of normal first pregnancy, antepartum    Rh negative, antepartum    COVID-19 affecting pregnancy in third trimester    Elevated blood pressure affecting pregnancy in third trimester, antepartum       All other systems reviewed and are negative.     The additional following portions of the patient's history were reviewed and updated as appropriate: allergies, current medications, past family history, past medical history, past social history, past surgical history, and problem list.      EXAM:     Vital signs: /78   Wt 80.3 kg (177 lb)   LMP  (LMP Unknown) Comment: pt. think her last period was about 2 mo. hasn't been sexually active for 2 mo. doesn't know if she could be pregnant because she hasn't checked  BMI 29.45 kg/m²   Appearance/psychiatric: To be in no distress  Constitutional: The patient is well nourished.  Cardiovascular: She does not have edema.  Respiratory: Respiratory effort is normal.  Gastrointestinal: Abdomen is soft, gravid, nontender, no rashes, heart tones are present, fundal height is size equals dates    Pelvic Exam: Yes.  Presentation: cephalic. Dilation: Closed. Effacement: 60. Station: -3.    Urine glucose/protein: See prenatal flowsheet       Assessment and Plan    Problem List Items Addressed This Visit          Gravid and     Rh negative, antepartum    Overview     2024 received 2024          Supervision of normal first pregnancy,  antepartum - Primary    Overview     PHAM finalized: 7/17/24 per estimated conception, confirmed by 18-week US    Optional testing NIPS,CF/SMA,AFP:  NIPS low risk XX, considering CF/SMA, AFP    COVID: Recommended 1/31/24  Flu: Recommended 1/31/24  Tdap: 4/24/2024 Rx     Anatomy US: 2/28/24 normal anatomy except for limited views of cranial structures due to positioning, posterior placenta,  g, 76%, AC 53%.  Follow up ordered   Follow up US: 3/29/24 normal anatomy, including cranial structures.  EFW 14.6%, AC 16.5%, follow up ordered  4/11/2024  (45) AC (41) ERIC 15.86 Vertex PP grade 1          Relevant Medications    doxylamine (UNISOM) 25 MG tablet    Other Relevant Orders    POC Urinalysis Dipstick (Completed)       Other    COVID-19 affecting pregnancy in third trimester    Overview     6/4/2024 continue weekly NST          Relevant Medications    aspirin 81 MG EC tablet       Impression  Pregnancy at 38w0d  Fetal status reassuring.   Activity and Exercise discussed.    Plan  GBS negative  Elective induction previously scheduled for July 15.  Continue with weekly nonstress test for history of COVID as well as baby aspirin.  Return to office in 1 week      Patient was counseled to the following pregnancy precautions:  Decreased fetal movement, if concern for decreased fetal movement please perform fetal kick counts you are looking for 10 movements in 2 hours.  If concern for fetal movement and not meeting that criteria, please present to triage for evaluation.  Contractions occurring every 5 minutes for over an hour, lasting 30 to 60 seconds and progressively causing more discomfort, please seek medical attention to rule out labor  If you believe that your water is broken, place a sanitary pad.  If pad fills in short period of time i.e. less than 5 minutes, take off pad placed another pad.  If this is saturated please present for rule out rupture of membranes  Vaginal bleeding can be normal in  pregnancy, this usually takes a form of spotting.  If having heavier bleeding like a menstrual period please present for evaluation; especially in light of severe abdominal pain this could represent a placental abruption.  Keep all scheduled appointments as recommended.        Hollis Thomson MD  07/03/2024

## 2024-07-10 ENCOUNTER — HOSPITAL ENCOUNTER (OUTPATIENT)
Facility: HOSPITAL | Age: 29
Discharge: HOME | End: 2024-07-10
Attending: OBSTETRICS & GYNECOLOGY | Admitting: OBSTETRICS & GYNECOLOGY
Payer: COMMERCIAL

## 2024-07-10 ENCOUNTER — HOSPITAL ENCOUNTER (OUTPATIENT)
Dept: LABOR AND DELIVERY | Facility: HOSPITAL | Age: 29
Discharge: HOME | End: 2024-07-10
Payer: COMMERCIAL

## 2024-07-10 VITALS — HEART RATE: 112 BPM | SYSTOLIC BLOOD PRESSURE: 127 MMHG | DIASTOLIC BLOOD PRESSURE: 80 MMHG | RESPIRATION RATE: 18 BRPM

## 2024-07-10 PROCEDURE — G0463 HOSPITAL OUTPT CLINIC VISIT: HCPCS

## 2024-07-10 PROCEDURE — 59025 FETAL NON-STRESS TEST: CPT | Performed by: OBSTETRICS & GYNECOLOGY

## 2024-07-10 PROCEDURE — 59025 FETAL NON-STRESS TEST: CPT

## 2024-07-10 NOTE — NON STRESS TEST
Obstetrical Non-stress Test Interpretation     Name:  Juanito Newberry  MRN: [id number]    29 y.o. female  at 39w0d  Indication: NST/HX OF COVID      Fetal Assessment  Fetal Movement: active  Fetal HR Assessment Method: external  Fetal HR (beats/min): 145  Fetal HR Baseline: normal range  Fetal HR Variability: moderate (amplitude range 6 to 25 bpm)  Fetal HR Accelerations: lasting at least 15 seconds  Fetal HR Decelerations: absent    /80 (BP Location: Right arm)   Pulse 112   Resp 18   LMP  (LMP Unknown) Comment: pt. think her last period was about 2 mo. hasn't been sexually active for 2 mo. doesn't know if she could be pregnant because she hasn't checked    Reason for test: OB Triage (NST/HX OF COVID)  Date of Test: 7/10/2024  Time frame of test: 1173-1745  RN NST Interpretation: Reactive      Danni Cortez RN  7/10/2024  14:37 EDT

## 2024-07-12 ENCOUNTER — ROUTINE PRENATAL (OUTPATIENT)
Dept: OBSTETRICS AND GYNECOLOGY | Facility: CLINIC | Age: 29
End: 2024-07-12
Payer: COMMERCIAL

## 2024-07-12 VITALS — DIASTOLIC BLOOD PRESSURE: 78 MMHG | SYSTOLIC BLOOD PRESSURE: 120 MMHG | WEIGHT: 182 LBS | BODY MASS INDEX: 30.29 KG/M2

## 2024-07-12 DIAGNOSIS — Z34.00 SUPERVISION OF NORMAL FIRST PREGNANCY, ANTEPARTUM: Primary | ICD-10-CM

## 2024-07-12 DIAGNOSIS — U07.1 COVID-19 AFFECTING PREGNANCY IN THIRD TRIMESTER: ICD-10-CM

## 2024-07-12 DIAGNOSIS — O98.513 COVID-19 AFFECTING PREGNANCY IN THIRD TRIMESTER: ICD-10-CM

## 2024-07-12 DIAGNOSIS — Z67.91 RH NEGATIVE, ANTEPARTUM: ICD-10-CM

## 2024-07-12 DIAGNOSIS — O26.899 RH NEGATIVE, ANTEPARTUM: ICD-10-CM

## 2024-07-12 LAB
GLUCOSE UR STRIP-MCNC: NEGATIVE MG/DL
PROT UR STRIP-MCNC: NEGATIVE MG/DL

## 2024-07-12 RX ORDER — ONDANSETRON 2 MG/ML
4 INJECTION INTRAMUSCULAR; INTRAVENOUS EVERY 6 HOURS PRN
Status: CANCELLED | OUTPATIENT
Start: 2024-07-12

## 2024-07-12 RX ORDER — IBUPROFEN 200 MG
600 TABLET ORAL EVERY 6 HOURS PRN
OUTPATIENT
Start: 2024-07-12

## 2024-07-12 RX ORDER — CITRIC ACID/SODIUM CITRATE 334-500MG
30 SOLUTION, ORAL ORAL ONCE AS NEEDED
Status: CANCELLED | OUTPATIENT
Start: 2024-07-12

## 2024-07-12 RX ORDER — SODIUM CHLORIDE 0.9 % (FLUSH) 0.9 %
10 SYRINGE (ML) INJECTION EVERY 12 HOURS SCHEDULED
Status: CANCELLED | OUTPATIENT
Start: 2024-07-12

## 2024-07-12 RX ORDER — HYDROXYZINE HYDROCHLORIDE 10 MG/1
50 TABLET, FILM COATED ORAL NIGHTLY PRN
OUTPATIENT
Start: 2024-07-12

## 2024-07-12 RX ORDER — ACETAMINOPHEN 325 MG/1
1000 TABLET ORAL EVERY 6 HOURS
OUTPATIENT
Start: 2024-07-12

## 2024-07-12 RX ORDER — SODIUM CHLORIDE 0.9 % (FLUSH) 0.9 %
10 SYRINGE (ML) INJECTION AS NEEDED
Status: CANCELLED | OUTPATIENT
Start: 2024-07-12

## 2024-07-12 RX ORDER — TERBUTALINE SULFATE 1 MG/ML
0.25 INJECTION, SOLUTION SUBCUTANEOUS AS NEEDED
Status: CANCELLED | OUTPATIENT
Start: 2024-07-12

## 2024-07-12 RX ORDER — MISOPROSTOL 100 UG/1
25 TABLET ORAL ONCE
Status: CANCELLED | OUTPATIENT
Start: 2024-07-12 | End: 2024-07-12

## 2024-07-12 RX ORDER — SODIUM CHLORIDE, SODIUM LACTATE, POTASSIUM CHLORIDE, CALCIUM CHLORIDE 600; 310; 30; 20 MG/100ML; MG/100ML; MG/100ML; MG/100ML
125 INJECTION, SOLUTION INTRAVENOUS CONTINUOUS
Status: CANCELLED | OUTPATIENT
Start: 2024-07-12

## 2024-07-12 RX ORDER — ONDANSETRON 4 MG/1
4 TABLET, ORALLY DISINTEGRATING ORAL EVERY 6 HOURS PRN
Status: CANCELLED | OUTPATIENT
Start: 2024-07-12

## 2024-07-12 RX ORDER — MORPHINE SULFATE 10 MG/ML
2 INJECTION INTRAMUSCULAR; INTRAVENOUS; SUBCUTANEOUS
Status: CANCELLED | OUTPATIENT
Start: 2024-07-12 | End: 2024-07-19

## 2024-07-12 RX ORDER — LIDOCAINE HYDROCHLORIDE 10 MG/ML
0.5 INJECTION, SOLUTION EPIDURAL; INFILTRATION; INTRACAUDAL; PERINEURAL ONCE AS NEEDED
Status: CANCELLED | OUTPATIENT
Start: 2024-07-12

## 2024-07-12 RX ORDER — HYDROXYZINE HYDROCHLORIDE 10 MG/1
50 TABLET, FILM COATED ORAL NIGHTLY PRN
Status: CANCELLED | OUTPATIENT
Start: 2024-07-12

## 2024-07-12 RX ORDER — ONDANSETRON 4 MG/1
4 TABLET, ORALLY DISINTEGRATING ORAL EVERY 6 HOURS PRN
OUTPATIENT
Start: 2024-07-12

## 2024-07-12 RX ORDER — ONDANSETRON 2 MG/ML
4 INJECTION INTRAMUSCULAR; INTRAVENOUS EVERY 6 HOURS PRN
OUTPATIENT
Start: 2024-07-12

## 2024-07-12 RX ORDER — CARBOPROST TROMETHAMINE 250 UG/ML
250 INJECTION, SOLUTION INTRAMUSCULAR AS NEEDED
OUTPATIENT
Start: 2024-07-12

## 2024-07-12 RX ORDER — OXYTOCIN/0.9 % SODIUM CHLORIDE 30/500 ML
999 PLASTIC BAG, INJECTION (ML) INTRAVENOUS ONCE
OUTPATIENT
Start: 2024-07-12 | End: 2024-07-12

## 2024-07-12 RX ORDER — ACETAMINOPHEN 325 MG/1
625 TABLET ORAL EVERY 4 HOURS PRN
Status: CANCELLED | OUTPATIENT
Start: 2024-07-12

## 2024-07-12 RX ORDER — MISOPROSTOL 100 UG/1
800 TABLET ORAL AS NEEDED
OUTPATIENT
Start: 2024-07-12

## 2024-07-12 RX ORDER — METHYLERGONOVINE MALEATE 0.2 MG/ML
200 INJECTION INTRAVENOUS ONCE AS NEEDED
OUTPATIENT
Start: 2024-07-12

## 2024-07-12 RX ORDER — OXYTOCIN/0.9 % SODIUM CHLORIDE 30/500 ML
250 PLASTIC BAG, INJECTION (ML) INTRAVENOUS CONTINUOUS
OUTPATIENT
Start: 2024-07-12 | End: 2024-07-12

## 2024-07-12 RX ORDER — SODIUM CHLORIDE 9 MG/ML
40 INJECTION, SOLUTION INTRAVENOUS AS NEEDED
Status: CANCELLED | OUTPATIENT
Start: 2024-07-12

## 2024-07-12 NOTE — PROGRESS NOTES
OB FOLLOW UP  Complaint   Chief Complaint   Patient presents with    Routine Prenatal Visit            Juanito Newberry is a 29 y.o.  39w2d patient being seen today for her obstetrical follow up visit. Patient denies decreased fetal movement, contractions, loss of fluid or vaginal bleeding.  No acute complaints.    Her prenatal care is complicated by (and status) :    Patient Active Problem List   Diagnosis    Supervision of normal first pregnancy, antepartum    Rh negative, antepartum    COVID-19 affecting pregnancy in third trimester    Elevated blood pressure affecting pregnancy in third trimester, antepartum       All other systems reviewed and are negative.     The additional following portions of the patient's history were reviewed and updated as appropriate: allergies, current medications, past family history, past medical history, past social history, past surgical history, and problem list.      EXAM:     Vital signs: /78   Wt 82.6 kg (182 lb)   LMP  (LMP Unknown) Comment: pt. think her last period was about 2 mo. hasn't been sexually active for 2 mo. doesn't know if she could be pregnant because she hasn't checked  BMI 30.29 kg/m²   Appearance/psychiatric: To be in no distress  Constitutional: The patient is well nourished.  Cardiovascular: She does not have edema.  Respiratory: Respiratory effort is normal.  Gastrointestinal: Abdomen is soft, gravid, nontender, no rashes, heart tones are present, fundal height is size equals dates    Pelvic Exam: Yes.  Presentation: cephalic. Dilation: Closed. Effacement: 60. Station: -3.    Urine glucose/protein: See prenatal flowsheet       Assessment and Plan    Problem List Items Addressed This Visit          Gravid and     Rh negative, antepartum    Overview     2024 received 2024          Supervision of normal first pregnancy, antepartum - Primary    Overview     PHAM finalized: 24 per estimated conception, confirmed by 18-week  US    Optional testing NIPS,CF/SMA,AFP:  NIPS low risk XX, considering CF/SMA, AFP    COVID: Recommended 1/31/24  Flu: Recommended 1/31/24  Tdap: 4/24/2024 Rx     Anatomy US: 2/28/24 normal anatomy except for limited views of cranial structures due to positioning, posterior placenta,  g, 76%, AC 53%.  Follow up ordered   Follow up US: 3/29/24 normal anatomy, including cranial structures.  EFW 14.6%, AC 16.5%, follow up ordered  4/11/2024  (45) AC (41) ERIC 15.86 Vertex PP grade 1          Relevant Medications    doxylamine (UNISOM) 25 MG tablet    Other Relevant Orders    POC Urinalysis Dipstick (Completed)       Other    COVID-19 affecting pregnancy in third trimester    Overview     6/4/2024 continue weekly NST          Relevant Medications    aspirin 81 MG EC tablet       Impression  Pregnancy at 39w2d  Fetal status reassuring.   Activity and Exercise discussed.    Plan  Patient scheduled for elective induction of labor this coming Monday.  Discussed with patient on favorable cervix and need for cervical ripening.  Discussed use of Cytotec as standard of care and obstetrics but off label use to FDA approval.  Patient GBS negative.  Admission orders placed.  Return to office in 6 weeks for postpartum follow-up.      Patient was counseled to the following pregnancy precautions:  Decreased fetal movement, if concern for decreased fetal movement please perform fetal kick counts you are looking for 10 movements in 2 hours.  If concern for fetal movement and not meeting that criteria, please present to triage for evaluation.  Contractions occurring every 5 minutes for over an hour, lasting 30 to 60 seconds and progressively causing more discomfort, please seek medical attention to rule out labor  If you believe that your water is broken, place a sanitary pad.  If pad fills in short period of time i.e. less than 5 minutes, take off pad placed another pad.  If this is saturated please present for rule out  rupture of membranes  Vaginal bleeding can be normal in pregnancy, this usually takes a form of spotting.  If having heavier bleeding like a menstrual period please present for evaluation; especially in light of severe abdominal pain this could represent a placental abruption.  Keep all scheduled appointments as recommended.        Hollis Thomson MD  07/12/2024

## 2024-07-15 ENCOUNTER — HOSPITAL ENCOUNTER (OUTPATIENT)
Dept: LABOR AND DELIVERY | Facility: HOSPITAL | Age: 29
Discharge: HOME | End: 2024-07-15
Payer: COMMERCIAL

## 2024-07-15 ENCOUNTER — ANESTHESIA (OUTPATIENT)
Dept: LABOR AND DELIVERY | Facility: HOSPITAL | Age: 29
End: 2024-07-15
Payer: COMMERCIAL

## 2024-07-15 ENCOUNTER — HOSPITAL ENCOUNTER (INPATIENT)
Facility: HOSPITAL | Age: 29
LOS: 2 days | Discharge: HOME | End: 2024-07-17
Attending: STUDENT IN AN ORGANIZED HEALTH CARE EDUCATION/TRAINING PROGRAM | Admitting: STUDENT IN AN ORGANIZED HEALTH CARE EDUCATION/TRAINING PROGRAM
Payer: COMMERCIAL

## 2024-07-15 ENCOUNTER — ANESTHESIA EVENT (OUTPATIENT)
Dept: LABOR AND DELIVERY | Facility: HOSPITAL | Age: 29
End: 2024-07-15
Payer: COMMERCIAL

## 2024-07-15 DIAGNOSIS — Z34.00 SUPERVISION OF NORMAL FIRST PREGNANCY, ANTEPARTUM: ICD-10-CM

## 2024-07-15 DIAGNOSIS — O26.899 RH NEGATIVE, ANTEPARTUM: ICD-10-CM

## 2024-07-15 DIAGNOSIS — Z67.91 RH NEGATIVE, ANTEPARTUM: ICD-10-CM

## 2024-07-15 PROBLEM — Z34.90 ENCOUNTER FOR ELECTIVE INDUCTION OF LABOR: Status: ACTIVE | Noted: 2024-07-15

## 2024-07-15 LAB
ABO GROUP BLD: NORMAL
AMPHET+METHAMPHET UR QL: NEGATIVE
ANISOCYTOSIS BLD QL: NORMAL
BARBITURATES UR QL SCN: NEGATIVE
BASOPHILS # BLD AUTO: 0.05 10*3/MM3 (ref 0–0.2)
BASOPHILS NFR BLD AUTO: 0.7 % (ref 0–1.5)
BENZODIAZ UR QL SCN: NEGATIVE
BLD GP AB SCN SERPL QL: NEGATIVE
CANNABINOIDS SERPL QL: NEGATIVE
COCAINE UR QL: NEGATIVE
DEPRECATED RDW RBC AUTO: 39.8 FL (ref 37–54)
EOSINOPHIL # BLD AUTO: 0.17 10*3/MM3 (ref 0–0.4)
EOSINOPHIL NFR BLD AUTO: 2.5 % (ref 0.3–6.2)
ERYTHROCYTE [DISTWIDTH] IN BLOOD BY AUTOMATED COUNT: 14.8 % (ref 12.3–15.4)
FENTANYL UR-MCNC: NEGATIVE NG/ML
HCT VFR BLD AUTO: 31.4 % (ref 34–46.6)
HGB BLD-MCNC: 10 G/DL (ref 12–15.9)
IMM GRANULOCYTES # BLD AUTO: 0.05 10*3/MM3 (ref 0–0.05)
IMM GRANULOCYTES NFR BLD AUTO: 0.7 % (ref 0–0.5)
LARGE PLATELETS: NORMAL
LYMPHOCYTES # BLD AUTO: 2.48 10*3/MM3 (ref 0.7–3.1)
LYMPHOCYTES NFR BLD AUTO: 36.9 % (ref 19.6–45.3)
MCH RBC QN AUTO: 23.8 PG (ref 26.6–33)
MCHC RBC AUTO-ENTMCNC: 31.8 G/DL (ref 31.5–35.7)
MCV RBC AUTO: 74.8 FL (ref 79–97)
METHADONE UR QL SCN: NEGATIVE
MICROCYTES BLD QL: NORMAL
MONOCYTES # BLD AUTO: 0.74 10*3/MM3 (ref 0.1–0.9)
MONOCYTES NFR BLD AUTO: 11 % (ref 5–12)
NEUTROPHILS NFR BLD AUTO: 3.23 10*3/MM3 (ref 1.7–7)
NEUTROPHILS NFR BLD AUTO: 48.2 % (ref 42.7–76)
NRBC BLD AUTO-RTO: 0 /100 WBC (ref 0–0.2)
OPIATES UR QL: NEGATIVE
OXYCODONE UR QL SCN: NEGATIVE
PLATELET # BLD AUTO: 309 10*3/MM3 (ref 140–450)
PMV BLD AUTO: 10.6 FL (ref 6–12)
POLYCHROMASIA BLD QL SMEAR: NORMAL
RBC # BLD AUTO: 4.2 10*6/MM3 (ref 3.77–5.28)
RH BLD: NEGATIVE
SMALL PLATELETS BLD QL SMEAR: ADEQUATE
SPHEROCYTES BLD QL SMEAR: NORMAL
T&S EXPIRATION DATE: NORMAL
TREPONEMA PALLIDUM IGG+IGM AB [PRESENCE] IN SERUM OR PLASMA BY IMMUNOASSAY: NORMAL
WBC MORPH BLD: NORMAL
WBC NRBC COR # BLD AUTO: 6.72 10*3/MM3 (ref 3.4–10.8)

## 2024-07-15 PROCEDURE — 86850 RBC ANTIBODY SCREEN: CPT | Performed by: STUDENT IN AN ORGANIZED HEALTH CARE EDUCATION/TRAINING PROGRAM

## 2024-07-15 PROCEDURE — 80307 DRUG TEST PRSMV CHEM ANLYZR: CPT | Performed by: STUDENT IN AN ORGANIZED HEALTH CARE EDUCATION/TRAINING PROGRAM

## 2024-07-15 PROCEDURE — 25810000003 SODIUM CHLORIDE 0.9 % SOLUTION: Performed by: STUDENT IN AN ORGANIZED HEALTH CARE EDUCATION/TRAINING PROGRAM

## 2024-07-15 PROCEDURE — 25010000002 OXYTOCIN PER 10 UNITS: Performed by: STUDENT IN AN ORGANIZED HEALTH CARE EDUCATION/TRAINING PROGRAM

## 2024-07-15 PROCEDURE — 25810000003 LACTATED RINGERS PER 1000 ML: Performed by: STUDENT IN AN ORGANIZED HEALTH CARE EDUCATION/TRAINING PROGRAM

## 2024-07-15 PROCEDURE — 85025 COMPLETE CBC W/AUTO DIFF WBC: CPT | Performed by: STUDENT IN AN ORGANIZED HEALTH CARE EDUCATION/TRAINING PROGRAM

## 2024-07-15 PROCEDURE — 25010000002 FENTANYL CITRATE (PF) 50 MCG/ML SOLUTION: Performed by: REGISTERED NURSE

## 2024-07-15 PROCEDURE — 85007 BL SMEAR W/DIFF WBC COUNT: CPT | Performed by: STUDENT IN AN ORGANIZED HEALTH CARE EDUCATION/TRAINING PROGRAM

## 2024-07-15 PROCEDURE — 86901 BLOOD TYPING SEROLOGIC RH(D): CPT | Performed by: STUDENT IN AN ORGANIZED HEALTH CARE EDUCATION/TRAINING PROGRAM

## 2024-07-15 PROCEDURE — 25810000003 LACTATED RINGERS SOLUTION: Performed by: STUDENT IN AN ORGANIZED HEALTH CARE EDUCATION/TRAINING PROGRAM

## 2024-07-15 PROCEDURE — S0260 H&P FOR SURGERY: HCPCS | Performed by: STUDENT IN AN ORGANIZED HEALTH CARE EDUCATION/TRAINING PROGRAM

## 2024-07-15 PROCEDURE — C1755 CATHETER, INTRASPINAL: HCPCS | Performed by: REGISTERED NURSE

## 2024-07-15 PROCEDURE — 86780 TREPONEMA PALLIDUM: CPT | Performed by: STUDENT IN AN ORGANIZED HEALTH CARE EDUCATION/TRAINING PROGRAM

## 2024-07-15 PROCEDURE — 86900 BLOOD TYPING SEROLOGIC ABO: CPT | Performed by: STUDENT IN AN ORGANIZED HEALTH CARE EDUCATION/TRAINING PROGRAM

## 2024-07-15 RX ORDER — ONDANSETRON 2 MG/ML
4 INJECTION INTRAMUSCULAR; INTRAVENOUS EVERY 6 HOURS PRN
Status: DISCONTINUED | OUTPATIENT
Start: 2024-07-15 | End: 2024-07-16 | Stop reason: HOSPADM

## 2024-07-15 RX ORDER — ONDANSETRON 2 MG/ML
4 INJECTION INTRAMUSCULAR; INTRAVENOUS ONCE AS NEEDED
Status: DISCONTINUED | OUTPATIENT
Start: 2024-07-15 | End: 2024-07-16 | Stop reason: SDUPTHER

## 2024-07-15 RX ORDER — FENTANYL CITRATE 50 UG/ML
INJECTION, SOLUTION INTRAMUSCULAR; INTRAVENOUS
Status: COMPLETED | OUTPATIENT
Start: 2024-07-15 | End: 2024-07-16

## 2024-07-15 RX ORDER — SODIUM CHLORIDE, SODIUM LACTATE, POTASSIUM CHLORIDE, CALCIUM CHLORIDE 600; 310; 30; 20 MG/100ML; MG/100ML; MG/100ML; MG/100ML
125 INJECTION, SOLUTION INTRAVENOUS CONTINUOUS
Status: DISCONTINUED | OUTPATIENT
Start: 2024-07-15 | End: 2024-07-16

## 2024-07-15 RX ORDER — CITRIC ACID/SODIUM CITRATE 334-500MG
30 SOLUTION, ORAL ORAL ONCE AS NEEDED
Status: DISCONTINUED | OUTPATIENT
Start: 2024-07-15 | End: 2024-07-16 | Stop reason: HOSPADM

## 2024-07-15 RX ORDER — EPHEDRINE SULFATE 50 MG/ML
5 INJECTION, SOLUTION INTRAVENOUS
Status: DISCONTINUED | OUTPATIENT
Start: 2024-07-15 | End: 2024-07-16 | Stop reason: HOSPADM

## 2024-07-15 RX ORDER — SODIUM CHLORIDE 0.9 % (FLUSH) 0.9 %
10 SYRINGE (ML) INJECTION EVERY 12 HOURS SCHEDULED
Status: DISCONTINUED | OUTPATIENT
Start: 2024-07-15 | End: 2024-07-16 | Stop reason: HOSPADM

## 2024-07-15 RX ORDER — LIDOCAINE HYDROCHLORIDE AND EPINEPHRINE 15; 5 MG/ML; UG/ML
INJECTION, SOLUTION EPIDURAL
Status: COMPLETED | OUTPATIENT
Start: 2024-07-15 | End: 2024-07-15

## 2024-07-15 RX ORDER — SODIUM CHLORIDE 0.9 % (FLUSH) 0.9 %
10 SYRINGE (ML) INJECTION AS NEEDED
Status: DISCONTINUED | OUTPATIENT
Start: 2024-07-15 | End: 2024-07-16 | Stop reason: HOSPADM

## 2024-07-15 RX ORDER — FENTANYL CITRATE 50 UG/ML
INJECTION, SOLUTION INTRAMUSCULAR; INTRAVENOUS
Status: COMPLETED
Start: 2024-07-15 | End: 2024-07-15

## 2024-07-15 RX ORDER — ACETAMINOPHEN 325 MG/1
625 TABLET ORAL EVERY 4 HOURS PRN
Status: DISCONTINUED | OUTPATIENT
Start: 2024-07-15 | End: 2024-07-16 | Stop reason: HOSPADM

## 2024-07-15 RX ORDER — FENTANYL/ROPIVACAINE/NS/PF 2MCG/ML-.2
PLASTIC BAG, INJECTION (ML) INJECTION
Status: COMPLETED
Start: 2024-07-15 | End: 2024-07-15

## 2024-07-15 RX ORDER — ONDANSETRON 4 MG/1
4 TABLET, ORALLY DISINTEGRATING ORAL EVERY 6 HOURS PRN
Status: DISCONTINUED | OUTPATIENT
Start: 2024-07-15 | End: 2024-07-16 | Stop reason: HOSPADM

## 2024-07-15 RX ORDER — LIDOCAINE HCL/EPINEPHRINE/PF 2%-1:200K
VIAL (ML) INJECTION
Status: COMPLETED
Start: 2024-07-15 | End: 2024-07-16

## 2024-07-15 RX ORDER — LIDOCAINE HYDROCHLORIDE 20 MG/ML
INJECTION, SOLUTION EPIDURAL; INFILTRATION; INTRACAUDAL; PERINEURAL
Status: COMPLETED
Start: 2024-07-15 | End: 2024-07-15

## 2024-07-15 RX ORDER — SODIUM CHLORIDE 9 MG/ML
40 INJECTION, SOLUTION INTRAVENOUS AS NEEDED
Status: DISCONTINUED | OUTPATIENT
Start: 2024-07-15 | End: 2024-07-16 | Stop reason: HOSPADM

## 2024-07-15 RX ORDER — HYDROXYZINE HYDROCHLORIDE 25 MG/1
50 TABLET, FILM COATED ORAL NIGHTLY PRN
Status: DISCONTINUED | OUTPATIENT
Start: 2024-07-15 | End: 2024-07-16 | Stop reason: HOSPADM

## 2024-07-15 RX ORDER — LIDOCAINE HYDROCHLORIDE 20 MG/ML
INJECTION, SOLUTION EPIDURAL; INFILTRATION; INTRACAUDAL; PERINEURAL
Status: COMPLETED | OUTPATIENT
Start: 2024-07-15 | End: 2024-07-16

## 2024-07-15 RX ORDER — LIDOCAINE HYDROCHLORIDE 10 MG/ML
0.5 INJECTION, SOLUTION EPIDURAL; INFILTRATION; INTRACAUDAL; PERINEURAL ONCE AS NEEDED
Status: DISCONTINUED | OUTPATIENT
Start: 2024-07-15 | End: 2024-07-16 | Stop reason: HOSPADM

## 2024-07-15 RX ORDER — MORPHINE SULFATE 2 MG/ML
2 INJECTION, SOLUTION INTRAMUSCULAR; INTRAVENOUS
Status: DISCONTINUED | OUTPATIENT
Start: 2024-07-15 | End: 2024-07-16 | Stop reason: HOSPADM

## 2024-07-15 RX ORDER — FAMOTIDINE 10 MG/ML
20 INJECTION, SOLUTION INTRAVENOUS ONCE AS NEEDED
Status: DISCONTINUED | OUTPATIENT
Start: 2024-07-15 | End: 2024-07-16

## 2024-07-15 RX ORDER — MISOPROSTOL 100 MCG
25 TABLET ORAL ONCE
Status: COMPLETED | OUTPATIENT
Start: 2024-07-15 | End: 2024-07-15

## 2024-07-15 RX ORDER — TERBUTALINE SULFATE 1 MG/ML
0.25 INJECTION, SOLUTION SUBCUTANEOUS AS NEEDED
Status: DISCONTINUED | OUTPATIENT
Start: 2024-07-15 | End: 2024-07-16 | Stop reason: HOSPADM

## 2024-07-15 RX ORDER — OXYTOCIN/0.9 % SODIUM CHLORIDE 30/500 ML
1 PLASTIC BAG, INJECTION (ML) INTRAVENOUS
Status: DISCONTINUED | OUTPATIENT
Start: 2024-07-15 | End: 2024-07-16

## 2024-07-15 RX ADMIN — OXYTOCIN 1 MILLI-UNITS/MIN: 10 INJECTION, SOLUTION INTRAMUSCULAR; INTRAVENOUS at 13:04

## 2024-07-15 RX ADMIN — Medication 10 ML/HR: at 21:43

## 2024-07-15 RX ADMIN — Medication 10 ML/HR: at 13:35

## 2024-07-15 RX ADMIN — Medication 25 MCG: at 07:50

## 2024-07-15 RX ADMIN — SODIUM CHLORIDE, POTASSIUM CHLORIDE, SODIUM LACTATE AND CALCIUM CHLORIDE 1000 ML: 600; 310; 30; 20 INJECTION, SOLUTION INTRAVENOUS at 06:20

## 2024-07-15 RX ADMIN — LIDOCAINE HYDROCHLORIDE AND EPINEPHRINE 3 ML: 15; 5 INJECTION, SOLUTION EPIDURAL at 13:28

## 2024-07-15 RX ADMIN — LIDOCAINE HYDROCHLORIDE AND EPINEPHRINE 2 ML: 15; 5 INJECTION, SOLUTION EPIDURAL at 13:33

## 2024-07-15 RX ADMIN — SODIUM CHLORIDE, POTASSIUM CHLORIDE, SODIUM LACTATE AND CALCIUM CHLORIDE 125 ML/HR: 600; 310; 30; 20 INJECTION, SOLUTION INTRAVENOUS at 11:12

## 2024-07-15 RX ADMIN — LIDOCAINE HYDROCHLORIDE 5 ML: 20 INJECTION, SOLUTION EPIDURAL; INFILTRATION; INTRACAUDAL; PERINEURAL at 13:33

## 2024-07-15 RX ADMIN — SODIUM CHLORIDE, POTASSIUM CHLORIDE, SODIUM LACTATE AND CALCIUM CHLORIDE 125 ML/HR: 600; 310; 30; 20 INJECTION, SOLUTION INTRAVENOUS at 23:03

## 2024-07-15 RX ADMIN — SODIUM CHLORIDE, POTASSIUM CHLORIDE, SODIUM LACTATE AND CALCIUM CHLORIDE 125 ML/HR: 600; 310; 30; 20 INJECTION, SOLUTION INTRAVENOUS at 07:51

## 2024-07-15 RX ADMIN — FENTANYL CITRATE 100 MCG: 50 INJECTION, SOLUTION INTRAMUSCULAR; INTRAVENOUS at 13:33

## 2024-07-15 NOTE — PROGRESS NOTES
OB Intrapartum Note    Subjective: No complaints; starting to feel more crampy with contractions.     Objective:  Baseline: 145  Variability:   Moderate/Normal (amplitude 6-25 bpm)  Accelerations: Present (32 weeks+) 15 x 15 bpm  Decelerations: None  Contractions:  Irregular    Cervical exam:    Dilation: FT    Effacement: 50%    Station: -2    Impression:    Category I  Reassuring fetus    Plan:   Cook catheter placed with 80mL NS in uterine balloon; begin Pitocin 1x1 Max of 10.  Patient agreeable to plan and desires to continue current induction course.       Electronically signed by Hollis Thomson MD, 07/15/24, 12:31 PM EDT.

## 2024-07-15 NOTE — ANESTHESIA PREPROCEDURE EVALUATION
Anesthesia Evaluation     Patient summary reviewed and Nursing notes reviewed   no history of anesthetic complications:   NPO Solid Status: > 8 hours  NPO Liquid Status: > 2 hours           Airway   Mallampati: II  TM distance: >3 FB  Dental - normal exam     Comment: Tongue ring    Pulmonary - negative pulmonary ROS and normal exam   Cardiovascular - negative cardio ROS and normal exam  Exercise tolerance: good (4-7 METS)        Neuro/Psych- negative ROS  GI/Hepatic/Renal/Endo    (+) GERD well controlled    Musculoskeletal (-) negative ROS    Abdominal    Substance History - negative use     OB/GYN    (+) Pregnant        Other - negative ROS       ROS/Med Hx Other:                Anesthesia Plan    ASA 2     epidural       Anesthetic plan, risks, benefits, and alternatives have been provided, discussed and informed consent has been obtained with: patient.  Pre-procedure education provided  Plan discussed with CRNA.    CODE STATUS:    Level Of Support Discussed With: Patient  Code Status (Patient has no pulse and is not breathing): CPR (Attempt to Resuscitate)  Medical Interventions (Patient has pulse or is breathing): Full

## 2024-07-15 NOTE — H&P
QUENTIN Leonardo  Obstetric History and Physical    Chief Complaint:  Scheduled IOL    Subjective:  The patient is a 29 y.o.  currently at 39w5d, who presents for scheduled elective induction of labor.  Reports good fetal movement, no vaginal bleeding, loss of fluid or regular contractions.  Denies fevers, chills, shortness of breath or chest pain.    Her prenatal care is complicated by:   Patient Active Problem List   Diagnosis    Supervision of normal first pregnancy, antepartum    Rh negative, antepartum    COVID-19 affecting pregnancy in third trimester    Elevated blood pressure affecting pregnancy in third trimester, antepartum    Encounter for elective induction of labor         The following portions of the patients history were reviewed and updated as appropriate: current medications, allergies, past medical history, past surgical history, past social history, and problem list .     Prenatal Information:  Prenatal Results       Initial Prenatal Labs       Test Value Reference Range Date Time    Hemoglobin  11.2 g/dL 12.0 - 15.9 24 1111       11.1 g/dL 12.0 - 15.9 24 0928    Hematocrit  32.1 % 34.0 - 46.6 24 1111       33.3 % 34.0 - 46.6 24 0928    Platelets  188 10*3/mm3 140 - 450 24 1111       180 10*3/mm3 140 - 450 24 0928    Rubella IgG  1.56 index Immune >0.99 24 1111    Hepatitis B SAg  Non-Reactive  Non-Reactive 24 1111    Hepatitis C Ab  Non-Reactive  Non-Reactive 24 1111    RPR        T. Pallidum Ab   Non-Reactive  Non-Reactive 07/15/24 0621       Non-Reactive  Non-Reactive 24 1111    ABO  O   07/15/24 0621    Rh  Negative   07/15/24 0621    Antibody Screen  Negative   24 1111    HIV        Urine Culture  No growth   24 1103    Gonorrhea  Negative  Negative 24 1103    Chlamydia  Negative  Negative 24 1103    TSH        HgB A1c         Varicella IgG        Hemoglobinopathy Fractionation  Comment   24 1111     Hemoglobinopathy (genetic testing)        Cystic fibrosis                   Fetal testing        Test Value Reference Range Date Time    NIPT        MSAFP        AFP-4                  2nd and 3rd Trimester       Test Value Reference Range Date Time    Hemoglobin (repeated)  10.0 g/dL 12.0 - 15.9 07/15/24 0621       10.0 g/dL 12.0 - 15.9 06/09/24 1511       10.9 g/dL 12.0 - 15.9 05/23/24 1318       10.9 g/dL 12.0 - 15.9 04/11/24 1016       10.2 g/dL 12.0 - 15.9 03/13/24 1749    Hematocrit (repeated)  31.4 % 34.0 - 46.6 07/15/24 0621       30.9 % 34.0 - 46.6 06/09/24 1511       33.0 % 34.0 - 46.6 05/23/24 1318       32.1 % 34.0 - 46.6 04/11/24 1016       30.3 % 34.0 - 46.6 03/13/24 1749    Platelets   309 10*3/mm3 140 - 450 07/15/24 0621       221 10*3/mm3 140 - 450 06/09/24 1511       239 10*3/mm3 140 - 450 05/23/24 1318       205 10*3/mm3 140 - 450 04/11/24 1016       177 10*3/mm3 140 - 450 03/13/24 1749       188 10*3/mm3 140 - 450 01/31/24 1111       180 10*3/mm3 140 - 450 01/24/24 0928    1 hour GTT   88 mg/dL 65 - 139 04/11/24 1016    Antibody Screen (repeated)  Negative   07/15/24 0621       Negative   04/25/24 1020    3rd TM syphilis scrn (repeated)  RPR         3rd TM syphilis scrn (repeated) TP-Ab  Non-Reactive  Non-Reactive 07/15/24 0621    3rd TM syphilis screen TB-Ab (FTA)        Syphilis cascade test TP-Ab (EIA)        Syphilis cascade TPPA        GTT Fasting        GTT 1 Hr        GTT 2 Hr        GTT 3 Hr        Group B Strep  Negative  Negative 06/20/24 0940              Other testing        Test Value Reference Range Date Time    Parvo IgG         CMV IgG                   Drug Screening       Test Value Reference Range Date Time    Amphetamine Screen        Barbiturate Screen  Negative  Negative 07/15/24 0620       Negative  Negative 01/31/24 1103    Benzodiazepine Screen  Negative  Negative 07/15/24 0620       Negative  Negative 01/31/24 1103    Methadone Screen  Negative  Negative 07/15/24 0620        Negative  Negative 01/31/24 1103    Phencyclidine Screen        Opiates Screen  Negative  Negative 07/15/24 0620       Negative  Negative 01/31/24 1103    THC Screen  Negative  Negative 07/15/24 0620       Positive  Negative 01/31/24 1103    Cocaine Screen  Negative  Negative 07/15/24 0620       Negative  Negative 01/31/24 1103    Propoxyphene Screen        Buprenorphine Screen        Methamphetamine Screen        Oxycodone Screen  Negative  Negative 07/15/24 0620       Negative  Negative 01/31/24 1103    Tricyclic Antidepressants Screen                  Legend    ^: Historical                          External Prenatal Results       Pregnancy Outside Results - Transcribed From Office Records - See Scanned Records For Details       Test Value Date Time    ABO  O  07/15/24 0621    Rh  Negative  07/15/24 0621    Antibody Screen  Negative  07/15/24 0621       Negative  04/25/24 1020       Negative  01/31/24 1111    Varicella IgG       Rubella  1.56 index 01/31/24 1111    Hgb  10.0 g/dL 07/15/24 0621       10.0 g/dL 06/09/24 1511       10.9 g/dL 05/23/24 1318       10.9 g/dL 04/11/24 1016       10.2 g/dL 03/13/24 1749       11.2 g/dL 01/31/24 1111       11.1 g/dL 01/24/24 0928    Hct  31.4 % 07/15/24 0621       30.9 % 06/09/24 1511       33.0 % 05/23/24 1318       32.1 % 04/11/24 1016       30.3 % 03/13/24 1749       32.1 % 01/31/24 1111       33.3 % 01/24/24 0928    HgB A1c        1h GTT  88 mg/dL 04/11/24 1016    3h GTT Fasting       3h GTT 1 hour       3h GTT 2 hour       3h GTT 3 hour        Gonorrhea (discrete)  Negative  01/31/24 1103    Chlamydia (discrete)  Negative  01/31/24 1103    RPR       Syphilis Antibody       HBsAg  Non-Reactive  01/31/24 1111    Herpes Simplex Virus PCR       Herpes Simplex VIrus Culture       HIV       Hep C RNA Quant PCR       Hep C Antibody  Non-Reactive  01/31/24 1111    AFP       NIPT       Cystic Fibroisis        Group B Strep  Negative  06/20/24 0940    GBS Susceptibility to  Clindamycin       GBS Susceptibility to Erythromycin       Fetal Fibronectin       Genetic Testing, Maternal Blood                 Drug Screening       Test Value Date Time    Urine Drug Screen       Amphetamine Screen       Barbiturate Screen  Negative  07/15/24 0620       Negative  24 1103    Benzodiazepine Screen  Negative  07/15/24 0620       Negative  24 1103    Methadone Screen  Negative  07/15/24 0620       Negative  24 1103    Phencyclidine Screen       Opiates Screen  Negative  07/15/24 0620       Negative  24 1103    THC Screen  Negative  07/15/24 0620       Positive  24 1103    Cocaine Screen       Propoxyphene Screen       Buprenorphine Screen       Methamphetamine Screen       Oxycodone Screen  Negative  07/15/24 0620       Negative  24 1103    Tricyclic Antidepressants Screen                 Legend    ^: Historical                            Past OB History:  OB History    Para Term  AB Living   1 0 0 0 0 0   SAB IAB Ectopic Molar Multiple Live Births   0 0 0 0 0 0      # Outcome Date GA Lbr Cristian/2nd Weight Sex Type Anes PTL Lv   1 Current                Past Medical History:  History reviewed. No pertinent past medical history.    Past Surgical History:  History reviewed. No pertinent surgical history.    Family History:  Family History   Problem Relation Age of Onset    Diabetes Paternal Grandmother     Depression Neg Hx     COPD Neg Hx     Cancer Neg Hx     Bleeding Disorder Neg Hx     Birth defects Neg Hx     Asthma Neg Hx     Arthritis Neg Hx     Alcohol abuse Neg Hx     Drug abuse Neg Hx     Early death Neg Hx     Hearing loss Neg Hx     Heart disease Neg Hx     Hyperlipidemia Neg Hx     Hypertension Neg Hx     Kidney disease Neg Hx     Learning disabilities Neg Hx     Malig Hyperthermia Neg Hx     Mental illness Neg Hx     Mental retardation Neg Hx     Miscarriages / Stillbirths Neg Hx        Social History:   reports that she has never smoked.  She has been exposed to tobacco smoke. She has never used smokeless tobacco.   reports that she does not currently use alcohol.   reports that she does not currently use drugs after having used the following drugs: Marijuana.    Medications:  aspirin, diphenhydrAMINE-zinc acetate, doxylamine, famotidine, and prenatal vitamin    Allergies:  No Known Allergies    Review of Systems:  No leaking fluid, No vaginal bleeding, No contractions, Is feeling adequate FM, No HA, No scotomata or vision changes, No RUQ/epigastric pain, No swelling, No fever/chills, No nausea, No vomiting, No diarrhea, No constipation, and No abdominal pain    Objective     Vital Signs:  BP: ()/(52-91) 97/52     Physical Exam:    General:  alert, well appearing, in no apparent distress  HEENT: PERRLA, extra ocular movement intact, sclera clear, anicteric, and neck supple with midline trachea  Cardiovascular: normal rate, regular rhythm,  no murmurs, rubs, or gallops  Lungs: clear to auscultation, no wheezes, rales or rhonchi, symmetric air entry  Abdomen: soft, nontender, nondistended, no abnormal masses, no epigastric pain, fundus soft, nontender 40 weeks size, and estimated fetal weight: 3500  Extremities: no pedal edema noted, no redness or tenderness in the calves or thighs   Pelvic exam per nursing: Presentation: cephalic  Dilation: Closed  Effacement: 50%  Station: -2    Fetal Assessment:    FHR:   Baseline: 135  Variability: Moderate  Accelerations: Present (32 weeks+) 15 x 15 bpm  Decelerations: Absent   Category: Category 1    Contractions: Irritability    Fetal Presentation: cephalic    Labs:   Lab Results (last 24 hours)       Procedure Component Value Units Date/Time    Urine Drug Screen - Urine, Clean Catch [[id number]]  (Normal) Collected: 07/15/24 0620    Specimen: Urine, Clean Catch Updated: 07/15/24 0649     Amphet/Methamphet, Screen Negative     Barbiturates Screen, Urine Negative     Benzodiazepine Screen, Urine Negative      Cocaine Screen, Urine Negative     Opiate Screen Negative     THC, Screen, Urine Negative     Methadone Screen, Urine Negative     Oxycodone Screen, Urine Negative     Fentanyl, Urine Negative    Narrative:      Negative Thresholds Per Drugs Screened:    Amphetamines                 500 ng/ml  Barbiturates                 200 ng/ml  Benzodiazepines              100 ng/ml  Cocaine                      300 ng/ml  Methadone                    300 ng/ml  Opiates                      300 ng/ml  Oxycodone                    100 ng/ml  THC                           50 ng/ml  Fentanyl                       5 ng/ml      The Normal Value for all drugs tested is negative. This report includes final unconfirmed screening results to be used for medical treatment purposes only. Unconfirmed results must not be used for non-medical purposes such as employment or legal testing. Clinical consideration should be applied to any drug of abuse test, particularly when unconfirmed results are used.            Treponema pallidum AB w/Reflex RPR [[id number]]  (Normal) Collected: 07/15/24 0621    Specimen: Blood Updated: 07/15/24 0657     Treponemal AB Total Non-Reactive    Narrative:      Reactive results will reflex RPR testing.    CBC & Differential [[id number]]  (Abnormal) Collected: 07/15/24 0621    Specimen: Blood Updated: 07/15/24 0738    Narrative:      The following orders were created for panel order CBC & Differential.  Procedure                               Abnormality         Status                     ---------                               -----------         ------                     CBC Auto Differential[[id number]]        Abnormal            Final result               Scan Slide[[id number]]                                       Final result                 Please view results for these tests on the individual orders.    CBC Auto Differential [[id number]]  (Abnormal) Collected: 07/15/24 0621    Specimen: Blood Updated: 07/15/24  0738     WBC 6.72 10*3/mm3      RBC 4.20 10*6/mm3      Hemoglobin 10.0 g/dL      Hematocrit 31.4 %      MCV 74.8 fL      MCH 23.8 pg      MCHC 31.8 g/dL      RDW 14.8 %      RDW-SD 39.8 fl      MPV 10.6 fL      Platelets 309 10*3/mm3      Neutrophil % 48.2 %      Lymphocyte % 36.9 %      Monocyte % 11.0 %      Eosinophil % 2.5 %      Basophil % 0.7 %      Immature Grans % 0.7 %      Neutrophils, Absolute 3.23 10*3/mm3      Lymphocytes, Absolute 2.48 10*3/mm3      Monocytes, Absolute 0.74 10*3/mm3      Eosinophils, Absolute 0.17 10*3/mm3      Basophils, Absolute 0.05 10*3/mm3      Immature Grans, Absolute 0.05 10*3/mm3      nRBC 0.0 /100 WBC     Scan Slide [[id number]] Collected: 07/15/24 0621    Specimen: Blood Updated: 07/15/24 0738     Anisocytosis Slight/1+     Microcytes Mod/2+     Polychromasia Slight/1+     Spherocytes Slight/1+     WBC Morphology Normal     Platelet Estimate Adequate     Large Platelets Slight/1+             Hospital Problems:    Encounter for elective induction of labor      Assessment:  29 y.o.  currently at 39w5d    Encounter for elective induction of labor    GBS: Negative    Plan:  See labor orders, plan for , IOL, cytotec and cervical catheter, then pitocin  Epidural upon request  Plan of care has been reviewed with patient  Risks, benefits of treatment plan have been discussed.  All questions have been answered.    Counseling:The patient was counseled on the risks, benefits and alternatives of Induction.  Risks reviewed, but are not limited to: bleeding, transfusion, fetal intolerance,  (possibly emergent),  and uterine rupture.  She declines expectant management or  and desires induction.  Reviewed risks w prematurity.  All her questions have been answered to her satisfaction and she desires to proceed.  Specifically with Cytotec, she understands that it is endorsed by the [employer], but not FDA approved for the induction of labor.    The  patient was counseled for the possible need of  section.  The risk, benefits, and alternatives of surgery were discussed with the patient.  The risks discussed included but were not limited to:  bleeding  infection  injury to surrounding structures including bowel and bladder  injury to vasculature  injury to   thrombosis  need for  hysterectomy  Risk of disfiguring scar  Need for blood transfusion  Maternal mortality    Patient willing to accept blood products after discussion of risk benefits and alternatives  Patient willing to step  hysterectomy in lifesaving situation      Hollis Thomson MD  7/15/2024  10:26 EDT

## 2024-07-15 NOTE — PLAN OF CARE
Goal Outcome Evaluation:  Plan of Care Reviewed With: patient        Progress: improving  Outcome Evaluation: Patient comfortable with epidural in place, adequate contraction pattern achieved, pitocin currently running at 2mU/min.

## 2024-07-15 NOTE — ANESTHESIA PROCEDURE NOTES
Labor Epidural    Pre-sedation assessment completed: 7/15/2024 1:23 PM    Patient reassessed immediately prior to procedure    Patient location during procedure: OB  Start Time: 7/15/2024 1:25 PM  Stop Time: 7/15/2024 1:35 PM  Performed By  CRNA/CAA: Kirti Mckinney CRNA  Preanesthetic Checklist  Completed: patient identified, IV checked, risks and benefits discussed, surgical consent, monitors and equipment checked, pre-op evaluation and timeout performed  Additional Notes  Placed epidural with ease. First attempt. No redirecting.  Prep:  Pt Position:sitting  Sterile Tech:cap, gloves, sterile barrier, mask and gown  Prep:povidone-iodine 7.5% surgical scrub  Monitoring:blood pressure monitoring, continuous pulse oximetry and EKG  Epidural Block Procedure:  Approach:midline  Guidance:landmark technique and palpation technique  Location:L3-L4  Needle Type:Tuohy  Needle Gauge:17 G  Loss of Resistance: 6cm  Cath Depth at skin:12 cm  Paresthesia: none  Aspiration:negative  Test Dose:negative  Medication: lidocaine PF 2% (XYLOCAINE) injection - Epidural   5 mL - 7/15/2024 1:33:00 PM  lidocaine 1.5%-EPINEPHrine 1:200,000 (XYLOCAINE W/EPI) injection - Epidural   3 mL - 7/15/2024 1:28:00 PM   2 mL - 7/15/2024 1:33:00 PM  fentaNYL citrate (PF) (SUBLIMAZE) injection - Epidural   100 mcg - 7/15/2024 1:33:00 PM  Number of Attempts: 1  Post Assessment:  Dressing:occlusive dressing applied and secured with tape  Pt Tolerance:patient tolerated the procedure well with no apparent complications  Complications:no

## 2024-07-16 PROBLEM — Z34.90 ENCOUNTER FOR ELECTIVE INDUCTION OF LABOR: Status: RESOLVED | Noted: 2024-07-15 | Resolved: 2024-07-16

## 2024-07-16 LAB
ABO GROUP BLD: NORMAL
BLD GP AB SCN SERPL QL: NEGATIVE
FETAL BLEED: NEGATIVE
NUMBER OF DOSES: ABNORMAL
RH BLD: NEGATIVE

## 2024-07-16 PROCEDURE — 86900 BLOOD TYPING SEROLOGIC ABO: CPT | Performed by: STUDENT IN AN ORGANIZED HEALTH CARE EDUCATION/TRAINING PROGRAM

## 2024-07-16 PROCEDURE — 0HQ9XZZ REPAIR PERINEUM SKIN, EXTERNAL APPROACH: ICD-10-PCS | Performed by: STUDENT IN AN ORGANIZED HEALTH CARE EDUCATION/TRAINING PROGRAM

## 2024-07-16 PROCEDURE — 25010000002 OXYTOCIN PER 10 UNITS: Performed by: STUDENT IN AN ORGANIZED HEALTH CARE EDUCATION/TRAINING PROGRAM

## 2024-07-16 PROCEDURE — 59025 FETAL NON-STRESS TEST: CPT

## 2024-07-16 PROCEDURE — 86901 BLOOD TYPING SEROLOGIC RH(D): CPT | Performed by: STUDENT IN AN ORGANIZED HEALTH CARE EDUCATION/TRAINING PROGRAM

## 2024-07-16 PROCEDURE — 25010000002 RHO D IMMUNE GLOBULIN 1500 UNIT/2ML SOLUTION PREFILLED SYRINGE: Performed by: STUDENT IN AN ORGANIZED HEALTH CARE EDUCATION/TRAINING PROGRAM

## 2024-07-16 PROCEDURE — 25010000002 ONDANSETRON PER 1 MG: Performed by: STUDENT IN AN ORGANIZED HEALTH CARE EDUCATION/TRAINING PROGRAM

## 2024-07-16 PROCEDURE — 25810000003 SODIUM CHLORIDE 0.9 % SOLUTION: Performed by: STUDENT IN AN ORGANIZED HEALTH CARE EDUCATION/TRAINING PROGRAM

## 2024-07-16 PROCEDURE — 59410 OBSTETRICAL CARE: CPT | Performed by: STUDENT IN AN ORGANIZED HEALTH CARE EDUCATION/TRAINING PROGRAM

## 2024-07-16 PROCEDURE — 51702 INSERT TEMP BLADDER CATH: CPT

## 2024-07-16 PROCEDURE — 25010000002 FENTANYL CITRATE (PF) 50 MCG/ML SOLUTION: Performed by: REGISTERED NURSE

## 2024-07-16 PROCEDURE — 85461 HEMOGLOBIN FETAL: CPT | Performed by: STUDENT IN AN ORGANIZED HEALTH CARE EDUCATION/TRAINING PROGRAM

## 2024-07-16 RX ORDER — SODIUM CHLORIDE 0.9 % (FLUSH) 0.9 %
1-10 SYRINGE (ML) INJECTION AS NEEDED
Status: DISCONTINUED | OUTPATIENT
Start: 2024-07-16 | End: 2024-07-17 | Stop reason: HOSPADM

## 2024-07-16 RX ORDER — OXYCODONE HYDROCHLORIDE 5 MG/1
5 TABLET ORAL EVERY 4 HOURS PRN
Status: DISCONTINUED | OUTPATIENT
Start: 2024-07-16 | End: 2024-07-17 | Stop reason: HOSPADM

## 2024-07-16 RX ORDER — LIDOCAINE HYDROCHLORIDE 20 MG/ML
INJECTION, SOLUTION EPIDURAL; INFILTRATION; INTRACAUDAL; PERINEURAL
Status: COMPLETED
Start: 2024-07-16 | End: 2024-07-16

## 2024-07-16 RX ORDER — PROMETHAZINE HYDROCHLORIDE 25 MG/1
12.5 TABLET ORAL EVERY 4 HOURS PRN
Status: DISCONTINUED | OUTPATIENT
Start: 2024-07-16 | End: 2024-07-17 | Stop reason: HOSPADM

## 2024-07-16 RX ORDER — CARBOPROST TROMETHAMINE 250 UG/ML
250 INJECTION, SOLUTION INTRAMUSCULAR
Status: DISCONTINUED | OUTPATIENT
Start: 2024-07-16 | End: 2024-07-17 | Stop reason: HOSPADM

## 2024-07-16 RX ORDER — CARBOPROST TROMETHAMINE 250 UG/ML
250 INJECTION, SOLUTION INTRAMUSCULAR AS NEEDED
Status: DISCONTINUED | OUTPATIENT
Start: 2024-07-16 | End: 2024-07-16 | Stop reason: HOSPADM

## 2024-07-16 RX ORDER — IBUPROFEN 600 MG/1
600 TABLET ORAL EVERY 6 HOURS SCHEDULED
Status: DISCONTINUED | OUTPATIENT
Start: 2024-07-16 | End: 2024-07-16

## 2024-07-16 RX ORDER — MISOPROSTOL 200 UG/1
800 TABLET ORAL AS NEEDED
Status: DISCONTINUED | OUTPATIENT
Start: 2024-07-16 | End: 2024-07-16 | Stop reason: HOSPADM

## 2024-07-16 RX ORDER — ONDANSETRON 4 MG/1
4 TABLET, ORALLY DISINTEGRATING ORAL EVERY 8 HOURS PRN
Status: DISCONTINUED | OUTPATIENT
Start: 2024-07-16 | End: 2024-07-17 | Stop reason: HOSPADM

## 2024-07-16 RX ORDER — ONDANSETRON 2 MG/ML
4 INJECTION INTRAMUSCULAR; INTRAVENOUS EVERY 6 HOURS PRN
Status: DISCONTINUED | OUTPATIENT
Start: 2024-07-16 | End: 2024-07-16 | Stop reason: HOSPADM

## 2024-07-16 RX ORDER — ONDANSETRON 4 MG/1
4 TABLET, ORALLY DISINTEGRATING ORAL EVERY 6 HOURS PRN
Status: DISCONTINUED | OUTPATIENT
Start: 2024-07-16 | End: 2024-07-16 | Stop reason: HOSPADM

## 2024-07-16 RX ORDER — OXYTOCIN/0.9 % SODIUM CHLORIDE 30/500 ML
250 PLASTIC BAG, INJECTION (ML) INTRAVENOUS CONTINUOUS
Status: ACTIVE | OUTPATIENT
Start: 2024-07-16 | End: 2024-07-16

## 2024-07-16 RX ORDER — ACETAMINOPHEN 500 MG
1000 TABLET ORAL EVERY 6 HOURS
Status: DISCONTINUED | OUTPATIENT
Start: 2024-07-16 | End: 2024-07-16 | Stop reason: HOSPADM

## 2024-07-16 RX ORDER — IBUPROFEN 600 MG/1
600 TABLET ORAL EVERY 6 HOURS
Status: DISCONTINUED | OUTPATIENT
Start: 2024-07-16 | End: 2024-07-17 | Stop reason: HOSPADM

## 2024-07-16 RX ORDER — METHYLERGONOVINE MALEATE 0.2 MG/ML
200 INJECTION INTRAVENOUS ONCE AS NEEDED
Status: DISCONTINUED | OUTPATIENT
Start: 2024-07-16 | End: 2024-07-16 | Stop reason: HOSPADM

## 2024-07-16 RX ORDER — OXYTOCIN/0.9 % SODIUM CHLORIDE 30/500 ML
999 PLASTIC BAG, INJECTION (ML) INTRAVENOUS ONCE
Status: COMPLETED | OUTPATIENT
Start: 2024-07-16 | End: 2024-07-16

## 2024-07-16 RX ORDER — IBUPROFEN 600 MG/1
600 TABLET ORAL ONCE
Status: COMPLETED | OUTPATIENT
Start: 2024-07-16 | End: 2024-07-16

## 2024-07-16 RX ORDER — MISOPROSTOL 200 UG/1
800 TABLET ORAL ONCE AS NEEDED
Status: DISCONTINUED | OUTPATIENT
Start: 2024-07-16 | End: 2024-07-17 | Stop reason: HOSPADM

## 2024-07-16 RX ORDER — HYDROXYZINE HYDROCHLORIDE 25 MG/1
50 TABLET, FILM COATED ORAL NIGHTLY PRN
Status: DISCONTINUED | OUTPATIENT
Start: 2024-07-16 | End: 2024-07-16 | Stop reason: HOSPADM

## 2024-07-16 RX ORDER — ONDANSETRON 2 MG/ML
4 INJECTION INTRAMUSCULAR; INTRAVENOUS EVERY 6 HOURS PRN
Status: DISCONTINUED | OUTPATIENT
Start: 2024-07-16 | End: 2024-07-17 | Stop reason: HOSPADM

## 2024-07-16 RX ORDER — PRENATAL VIT/IRON FUM/FOLIC AC 27MG-0.8MG
1 TABLET ORAL DAILY
Status: DISCONTINUED | OUTPATIENT
Start: 2024-07-16 | End: 2024-07-17 | Stop reason: HOSPADM

## 2024-07-16 RX ORDER — LIDOCAINE HCL/EPINEPHRINE/PF 2%-1:200K
VIAL (ML) INJECTION AS NEEDED
Status: DISCONTINUED | OUTPATIENT
Start: 2024-07-16 | End: 2024-07-17 | Stop reason: SURG

## 2024-07-16 RX ORDER — OXYTOCIN/0.9 % SODIUM CHLORIDE 30/500 ML
125 PLASTIC BAG, INJECTION (ML) INTRAVENOUS ONCE AS NEEDED
Status: DISCONTINUED | OUTPATIENT
Start: 2024-07-16 | End: 2024-07-17 | Stop reason: HOSPADM

## 2024-07-16 RX ORDER — ACETAMINOPHEN 500 MG
1000 TABLET ORAL EVERY 6 HOURS
Status: DISCONTINUED | OUTPATIENT
Start: 2024-07-16 | End: 2024-07-17 | Stop reason: HOSPADM

## 2024-07-16 RX ORDER — METHYLERGONOVINE MALEATE 0.2 MG/ML
200 INJECTION INTRAVENOUS ONCE AS NEEDED
Status: DISCONTINUED | OUTPATIENT
Start: 2024-07-16 | End: 2024-07-17 | Stop reason: HOSPADM

## 2024-07-16 RX ORDER — BISACODYL 10 MG
10 SUPPOSITORY, RECTAL RECTAL DAILY PRN
Status: DISCONTINUED | OUTPATIENT
Start: 2024-07-17 | End: 2024-07-17 | Stop reason: HOSPADM

## 2024-07-16 RX ORDER — DOCUSATE SODIUM 100 MG/1
100 CAPSULE, LIQUID FILLED ORAL DAILY
Status: DISCONTINUED | OUTPATIENT
Start: 2024-07-16 | End: 2024-07-17 | Stop reason: HOSPADM

## 2024-07-16 RX ORDER — CALCIUM CARBONATE 500 MG/1
2 TABLET, CHEWABLE ORAL 3 TIMES DAILY PRN
Status: DISCONTINUED | OUTPATIENT
Start: 2024-07-16 | End: 2024-07-17 | Stop reason: HOSPADM

## 2024-07-16 RX ORDER — IBUPROFEN 600 MG/1
600 TABLET ORAL EVERY 6 HOURS PRN
Status: DISCONTINUED | OUTPATIENT
Start: 2024-07-16 | End: 2024-07-16 | Stop reason: HOSPADM

## 2024-07-16 RX ORDER — FENTANYL CITRATE 50 UG/ML
INJECTION, SOLUTION INTRAMUSCULAR; INTRAVENOUS
Status: COMPLETED
Start: 2024-07-16 | End: 2024-07-16

## 2024-07-16 RX ADMIN — LIDOCAINE HYDROCHLORIDE,EPINEPHRINE BITARTRATE 10 ML: 20; .005 INJECTION, SOLUTION EPIDURAL; INFILTRATION; INTRACAUDAL; PERINEURAL at 05:51

## 2024-07-16 RX ADMIN — OXYTOCIN 250 ML/HR: 10 INJECTION, SOLUTION INTRAMUSCULAR; INTRAVENOUS at 07:57

## 2024-07-16 RX ADMIN — MISOPROSTOL 800 MCG: 200 TABLET ORAL at 07:39

## 2024-07-16 RX ADMIN — ACETAMINOPHEN 1000 MG: 325 TABLET ORAL at 14:36

## 2024-07-16 RX ADMIN — OXYTOCIN 999 ML/HR: 10 INJECTION, SOLUTION INTRAMUSCULAR; INTRAVENOUS at 07:32

## 2024-07-16 RX ADMIN — FENTANYL CITRATE 100 MCG: 50 INJECTION, SOLUTION INTRAMUSCULAR; INTRAVENOUS at 04:13

## 2024-07-16 RX ADMIN — IBUPROFEN 600 MG: 600 TABLET, FILM COATED ORAL at 12:36

## 2024-07-16 RX ADMIN — Medication 10 ML/HR: at 04:05

## 2024-07-16 RX ADMIN — ACETAMINOPHEN 1000 MG: 325 TABLET ORAL at 20:53

## 2024-07-16 RX ADMIN — FAMOTIDINE 20 MG: 10 INJECTION INTRAVENOUS at 03:09

## 2024-07-16 RX ADMIN — ACETAMINOPHEN 1000 MG: 500 TABLET ORAL at 07:55

## 2024-07-16 RX ADMIN — IBUPROFEN 600 MG: 600 TABLET, FILM COATED ORAL at 18:18

## 2024-07-16 RX ADMIN — WITCH HAZEL: 500 SOLUTION RECTAL; TOPICAL at 09:02

## 2024-07-16 RX ADMIN — LIDOCAINE HYDROCHLORIDE 5 ML: 20 INJECTION, SOLUTION EPIDURAL; INFILTRATION; INTRACAUDAL; PERINEURAL at 04:13

## 2024-07-16 RX ADMIN — ONDANSETRON 4 MG: 2 INJECTION INTRAMUSCULAR; INTRAVENOUS at 04:48

## 2024-07-16 RX ADMIN — HUMAN RHO(D) IMMUNE GLOBULIN 1500 UNITS: 1500 SOLUTION INTRAMUSCULAR; INTRAVENOUS at 21:07

## 2024-07-16 RX ADMIN — BENZOCAINE AND LEVOMENTHOL: 200; 5 SPRAY TOPICAL at 09:02

## 2024-07-16 NOTE — PLAN OF CARE
Problem: Adult Inpatient Plan of Care  Goal: Plan of Care Review  Outcome: Ongoing, Progressing  Flowsheets (Taken 7/16/2024 0641)  Progress: improving  Plan of Care Reviewed With: patient   Goal Outcome Evaluation:  Plan of Care Reviewed With: patient        Progress: improving

## 2024-07-16 NOTE — LACTATION NOTE
LC in to assist with this feeding. Patient placed baby to the breast herself and baby latched on easily. LC encouraged her to support her breasts more and bring in baby closer to her body. LC discussed with patient about  normal  breastfeeding behaviors and breastfeeding expectations for the next 2 days and to call as needed for lactation assistance . Patient showed good understanding.

## 2024-07-16 NOTE — PLAN OF CARE
Goal Outcome Evaluation:              Outcome Evaluation: pt pushing at end of shift. Making good progress

## 2024-07-16 NOTE — PROGRESS NOTES
OB Intrapartum Note    Subjective: No complaints, Comfortable with epidural    Objective:  Baseline: 140  Variability:   Moderate/Normal (amplitude 6-25 bpm)  Accelerations: Present (32 weeks+) 15 x 15 bpm  Decelerations: None  Contractions:  Regular    Cervical exam:    Dilation: 4cm    Effacement: 60%    Station: -3    Impression:    Category I  Reassuring fetus, AROM, Clear fluid, Pain controlled    Plan:   Continue pitocin  Continue to monitor  Allow to rest and decend  Pelvis feels clinically adequate  Reviewed course/progress/plan with pt.  All questions answered to pts satisfaction.  Pt desires to proceed as outlined.  I have discussed in detail with patient the current plan to include, but NLT, appropriate expectations for labor and delivery, to include possible length of time expected for delivery and hospital stay.  All questions have been answered to pts satisfaction and pt desires to proceed as noted.        Electronically signed by Hollis Thomson MD, 07/15/24, 9:30 PM EDT.

## 2024-07-16 NOTE — DISCHARGE INSTRUCTIONS
DR. PEOPLES'S POSTPARTUM DISCHARGE PRECAUTIONS and Answers to FAQs    NO SEX for [SIX] weeks.    NO TUB BATH or POOL for [TWO] week(s), shower only.  Sitz baths are fine.    STITCHES (if present):  wash them daily in the shower with soap and water (any type of soap is fine, it does not need to be antibacterial soap).  It is ok to gently put your finger in and around the vaginal area.  Look at your stitches (the ones on the outside) when you get home.  You will then know what is normal and can have a point of reference to compare it to if you start to have concerns.  REDNESS, PUS, increase in PAIN, FEVER or CHILLS are all reasons to be seen our office immediately.  Go to the ER, if it is after hours or a weekend.      VAGINAL BLEEDING:  may continue on and off over the next several weeks after delivery and may increase slightly once you go home.  You should not be bleeding more than 1 large pad soaked every hour or two.  Clots (even the size of a lemon or larger) may be normal as long as the bleeding is not heavy and the clots do not continue.      FEVER or CHILLS or NOT FEELING WELL: call our office.  If the office is closed, you need to be seen in acute care or ER.      CHEST PAIN or SHORTNESS OF BREATH/AIR: you need to GO TO THE NEAREST ER or CALL 911.     SWELLING: can increase over the next 7-10 days and then should slowly improve.  Your legs/ankles should be fairly similar in size.  A red, painful, hot, swollen leg (usually just one side) can be a sign of a blood clot and should be evaluated immediately.  Call our office.  If it is after hours or a weekend, you must be seen IMMEDIATELY IN THE ER.     ELEVATED BLOOD PRESSURE:  you need to contact us if you are having  persistent elevated BP systolic (top number) more than [155] or diastolic (bottom number) more than [95], or a headache (not relieved with rest, hydration or over the counter pain reliever), an increase in your swelling (usually hands and face),  changes in your vision (typically flashing white or black spots) or severe persistent pain in the location of the upper right side of your belly (under your right breast).  Call our office or go to ER if after hours or a weekend.    LACTATION QUESTIONS or CONCERNS?  Call [facility] Lactation Support [phone].    WORK and SCHOOL TIME OFF: depends on your specific delivery type, surrounding circumstances, and your work insurance/school rules.  If you have questions, please call Michelle or Leila at [phone] (ext. [phone]).  Or email Michelle at [email or web address].  They will assist in required paperwork for you and/or family members.     Any further QUESTIONS or CONCERNS, please call [facility] at [phone].

## 2024-07-16 NOTE — PROGRESS NOTES
OB Intrapartum Note    Subjective: No complaints, Comfortable with epidural    Objective:  Baseline: 135  Variability:   Minimal (amplitude 1-5 bpm)  Accelerations: Present (32 weeks+) 15 x 15 bpm  Decelerations: None  Contractions:  Regular    Cervical exam:    Dilation: 4cm    Effacement: 80%    Station: -2    Impression:    Category I  Reassuring fetus, Pain controlled, IUPC placed    Plan:   Continue pitocin, MVU goal >180, over 10 minute interval, with CTX every 2-3 minutes   Continue to monitor  Allow to rest and decend  Pelvis feels clinically adequate  Reviewed course/progress/plan with pt.  All questions answered to pts satisfaction.  Pt desires to proceed as outlined.  I have discussed in detail with patient the current plan to include, but NLT, appropriate expectations for labor and delivery, to include possible length of time expected for delivery and hospital stay.  All questions have been answered to pts satisfaction and pt desires to proceed as noted.        Electronically signed by Hollis Thomson MD, 07/16/24, 12:40 AM EDT.

## 2024-07-16 NOTE — PROGRESS NOTES
OB Intrapartum Note    Subjective: Pain controlled, feeling increase pressure with desire to push with contractions.     Objective:  Baseline: 150  Variability:   Moderate/Normal (amplitude 6-25 bpm)  Accelerations: Absent   Decelerations: Early, head compression, vs. Late decelerations  Contractions:  Regular    Cervical exam:    Dilation: Complete    Effacement: 100%    Station: +\1    Impression:    Category II  Reassuring fetus    Plan:   Continue pushing  Pelvis feels clinically adequate  Reviewed course/progress/plan with pt.  All questions answered to pts satisfaction.  Pt desires to proceed as outlined.  I have discussed in detail with patient the current plan to include, but NLT, appropriate expectations for labor and delivery, to include possible length of time expected for delivery and hospital stay.  All questions have been answered to pts satisfaction and pt desires to proceed as noted.        Electronically signed by Hollis Thomson MD, 07/16/24, 6:42 AM EDT.

## 2024-07-16 NOTE — L&D DELIVERY NOTE
[facility]   Vaginal Delivery Note    Patient Name: Juanito Newberry  : 1995  MRN: [id number]    Date of Delivery: 2024     Diagnosis     Pre & Post-Delivery:  Intrauterine pregnancy at 39w6d  Labor status:       (normal spontaneous vaginal delivery)             Problem List    Transfer to Postpartum     Review the Delivery Report for details.     Delivery     Delivery:      YOB: 2023    Time of Birth:  Gestational Age 7:29 AM   39w6d     Anesthesia: Epidural     Delivering clinician: Hollis Thomson    Forceps?   No   Vacuum? No    Shoulder dystocia present: No        Delivery narrative:    Procedure: Spontaneous vaginal delivery   Attending: Hollis Thomson MD  EBL: 300 ml  Infant: female  infant   7# 7 ounces  APGARS:   5 @ 1 minute /   9 @ 5 minutes  Specimen: none  Complications: none  Optimal cord clamping: Yes    Delivery: The patient pushed for a spontaneous vaginal delivery after approximately pushing for 1 hour and 30 minutes.  Fetal head restitution to maternal left.  A nuchal cordwas not present.  With gentle downward pressure the anterior shoulder left, delivered without incident.  The remainder of the  followed.  Delayed cord clamping was undertaken for 60 seconds.  The umbilical cord was doubly clamped and cut.  The infant was handed off to the waiting staff.  Gases were not sent.  The placenta did follow spontaneously.  The uterus was not explored.  The perineum was examined for laceration.  A first-degree perineal laceration was sustained.  Mother and  doing well in labor and delivery suite upon exiting.    Perineum : 1st degree laceration            Infant     Findings: female  infant     Infant observations: Weight: 7# 7 ounces   Length:   in  Observations/Comments:        Apgars:   5 @ 1 minute /      9 @ 5 minutes         Placenta & Cord         Placenta delivered    at        Cord:   present.   Nuchal Cord?  no   Cord blood obtained: Yes   "  Cord gases obtained:  No    Cord gas results: Venous:  No results found for: \"PHCVEN\", \"BECVEN\"    Arterial:  No results found for: \"PHCART\", \"BECART\"     Repair     Episiotomy: None     No    Lacerations: Yes  Laceration Information  Laceration Repaired?   Perineal: 1st   Yes   Periurethral:       Labial:       Sulcus:       Vaginal:       Cervical:         Suture used for repair: 3-0 Vicryl     Estimated Blood Loss: Est. Blood Loss (mL): 300 mL (Filed from Delivery Summary) (07/16/24 0729)     Quantitative Blood Loss:          Complications     none    Disposition     Mother to Mother Baby/Postpartum  in stable condition currently.  Baby to remains with mom  in stable condition currently.    Hollis Thomson MD  07/16/24  07:49 EDT        "

## 2024-07-16 NOTE — DISCHARGE SUMMARY
"             [facility]         DISCHARGE SUMMARY    Patient Name: Juanito Newberry  : 1995  MRN: [id number]    Date of Admission: 7/15/2024  Date of Discharge:  2024   Primary Care Physician: Provider, No Known    Consults       No orders found from 2024 to 2024.             Procedures:  Normal spontaneous vaginal delivery    Presenting Problem:   Encounter for elective induction of labor [Z34.90]  39-week 5-day gestational age  Rh-      Admitting Diagnosis:  Encounter for elective induction of labor [Z34.90]  39-week 5-day gestational age  Rh-    Discharge Diagnosis:  Vaginal delivery, delivered    Delivery Summary     OB Surgeon:  Hollis Thomson MD  Anesthesia: Epidural  Delivery Type:   Perineum: OBPERINEUM: 1st degree laceration  Feeding method: Breast    Infant: female  infant;    Weight: 3380 g (7 lb 7.2 oz)     APGARS: 5  @ 1 minute / 9  @ 5 minutes   Venous Blood Gas: No results found for: \"PHCVEN\", \"BECVEN\"   Arterial Blood Gas: No results found for: \"PHCART\", \"BECART\"     Hospital Course     Hospital Course:  Juanito Newberry is a 29 y.o.  39w6d who presented on 7/15/2024 for scheduled elective induction of labor.  Patient was found to have an unfavorable cervix.  Cervical ripening was undertaken with 1 dose of Cytotec vaginally with subsequent placement of Cook catheter and low-dose Pitocin augmentation.  Patient had expulsion of balloon and received epidural.  Patient had amniotomy performed the evening of 7/15/2024.  Patient progressed to complete dilation with Pitocin augmentation.  Patient pushed for spontaneous vaginal delivery.  See delivery documentation for details.  Her postpartum course was uncomplicated. By day of discharge her vital signs were stable, she was voiding, ambulating, eating and pain was tolerable. She was deemed stable for discharge with follow up in the outpatient setting.      Day of Discharge     Vital Signs:  Temp:  [97.5 °F (36.4 " °C)-99.3 °F (37.4 °C)] 97.9 °F (36.6 °C)  Heart Rate:  [] 81  Resp:  [16-18] 18  BP: ()/(62-95) 98/67    Pertinent  and/or Most Recent Results     LAB RESULTS:       Lab 07/15/24  0621   WBC 6.72   HEMOGLOBIN 10.0*   HEMATOCRIT 31.4*   PLATELETS 309   NEUTROS ABS 3.23   IMMATURE GRANS (ABS) 0.05   LYMPHS ABS 2.48   MONOS ABS 0.74   EOS ABS 0.17   MCV 74.8*                 Lab 07/16/24  1759 07/15/24  0621   ABO TYPING O O   RH TYPING Negative Negative   ANTIBODY SCREEN Negative Negative     URINALYSIS@  Microbiology Results (last 10 days)       ** No results found for the last 240 hours. **               Discharge Details        Discharge Medications        New Medications        Instructions Start Date   acetaminophen 500 MG tablet  Commonly known as: TYLENOL   1,000 mg, Oral, Every 6 Hours      ibuprofen 600 MG tablet  Commonly known as: ADVIL,MOTRIN   600 mg, Oral, Every 6 Hours             Continue These Medications        Instructions Start Date   prenatal (CLASSIC) vitamin 28-0.8 MG tablet tablet  Generic drug: prenatal vitamin   Oral, Daily             Stop These Medications      aspirin 81 MG EC tablet     diphenhydrAMINE-zinc acetate 2-0.1 % cream  Commonly known as: Benadryl Extra Strength     doxylamine 25 MG tablet  Commonly known as: UNISOM     famotidine 20 MG tablet  Commonly known as: Pepcid              No Known Allergies    Discharge Disposition:   Home, self-care    Discharge Condition:  Good    Diet:   Regular    Discharge Activity:    See detailed discharge instructions    Follow Up:  Future Appointments   Date Time Provider Department Center   8/23/2024 12:50 PM Hollis Thomson MD [facility]       Electronically signed by Hollis Thomson MD

## 2024-07-17 ENCOUNTER — PATIENT OUTREACH (OUTPATIENT)
Dept: LABOR AND DELIVERY | Facility: HOSPITAL | Age: 29
End: 2024-07-17
Payer: COMMERCIAL

## 2024-07-17 VITALS
OXYGEN SATURATION: 100 % | DIASTOLIC BLOOD PRESSURE: 69 MMHG | SYSTOLIC BLOOD PRESSURE: 122 MMHG | TEMPERATURE: 97.3 F | HEIGHT: 65 IN | HEART RATE: 84 BPM | WEIGHT: 182 LBS | BODY MASS INDEX: 30.32 KG/M2 | RESPIRATION RATE: 18 BRPM

## 2024-07-17 PROCEDURE — 0503F POSTPARTUM CARE VISIT: CPT | Performed by: STUDENT IN AN ORGANIZED HEALTH CARE EDUCATION/TRAINING PROGRAM

## 2024-07-17 RX ORDER — IBUPROFEN 600 MG/1
600 TABLET ORAL EVERY 6 HOURS
Qty: 28 TABLET | Refills: 0 | Status: SHIPPED | OUTPATIENT
Start: 2024-07-17 | End: 2024-07-24

## 2024-07-17 RX ORDER — ACETAMINOPHEN 500 MG
1000 TABLET ORAL EVERY 6 HOURS
Qty: 56 TABLET | Refills: 0 | Status: SHIPPED | OUTPATIENT
Start: 2024-07-17 | End: 2024-07-24

## 2024-07-17 RX ADMIN — ACETAMINOPHEN 1000 MG: 325 TABLET ORAL at 15:59

## 2024-07-17 RX ADMIN — IBUPROFEN 600 MG: 600 TABLET, FILM COATED ORAL at 00:00

## 2024-07-17 RX ADMIN — ACETAMINOPHEN 1000 MG: 325 TABLET ORAL at 03:30

## 2024-07-17 RX ADMIN — VITAMIN A, VITAMIN C, VITAMIN D, VITAMIN E, THIAMINE, RIBOFLAVIN, NIACIN, VITAMIN B6, FOLIC ACID, VITAMIN B12, CALCIUM, IRON, ZINC, COPPER 1 TABLET: 4000; 120; 400; 22; 1.84; 3; 20; 10; 1; 12; 200; 27; 25; 2 TABLET ORAL at 09:45

## 2024-07-17 RX ADMIN — IBUPROFEN 600 MG: 600 TABLET, FILM COATED ORAL at 11:32

## 2024-07-17 RX ADMIN — DOCUSATE SODIUM 100 MG: 100 CAPSULE, LIQUID FILLED ORAL at 09:45

## 2024-07-17 RX ADMIN — IBUPROFEN 600 MG: 600 TABLET, FILM COATED ORAL at 04:35

## 2024-07-17 RX ADMIN — ACETAMINOPHEN 1000 MG: 325 TABLET ORAL at 08:25

## 2024-07-17 NOTE — PLAN OF CARE
Problem: Adjustment to Role Transition (Postpartum Vaginal Delivery)  Goal: Successful Maternal Role Transition  Outcome: Ongoing, Progressing     Problem: Bleeding (Postpartum Vaginal Delivery)  Goal: Hemostasis  Outcome: Ongoing, Progressing     Problem: Pain (Postpartum Vaginal Delivery)  Goal: Acceptable Pain Control  Outcome: Ongoing, Progressing     Problem: Breastfeeding  Goal: Effective Breastfeeding  Outcome: Ongoing, Progressing   Goal Outcome Evaluation:                 Progressing as expected

## 2024-07-17 NOTE — PLAN OF CARE
Goal Outcome Evaluation:  Plan of Care Reviewed With: patient           Outcome Evaluation: Vital signs stable. Pain controlled. Dane TURNER

## 2024-07-17 NOTE — LACTATION NOTE
LC in to follow up with breastfeeding progress. LC noted that she continues to exclusively breastfeed. Today baby is tugging on the breast and LC suggested to bring baby closer to her body and provided more arm support to help with this. Infant's weight loss is down 5% and baby has not stooled since birth. LC discussed her worries. She has a pedi appointment tomorrow. Patient is planning on discharge today. LC discussed normal infant output patterns to expect and if infant is not waking by 3 hours to wake and feed using measures shown in the hospital. LC discussed checking to make sure new medications are safe to breastfeed. LC discussed alcohol use and cigarette/second hand smoke around baby and breastfeeding and discussed the impact of street drugs on infants and breastfeeding. LC used the page in the breastfeeding guide to discuss harmful effects of these. Breastfeeding/Lactation expectations and anticipatory guidance discussed for the next two weeks . LC discussed nipple care, plugged ducts, engorgement, and breast infection. LC encouraged mom to see pediatrician two days from discharge for follow up. Patient has a breastpump for home use and LC discussed good pumping guidelines and normal expectations with pumping and storage and preparation of ebm for feedings. LC discussed breastfeeding/lactation resources including the local breastfeeding support group after discharge and when to call the doctor. Patient showed good understanding.

## 2024-07-17 NOTE — ANESTHESIA POSTPROCEDURE EVALUATION
Patient: Juanito Newberry    Procedure Summary       Date: 07/15/24 Room / Location:     Anesthesia Start: 1323 Anesthesia Stop:     Procedure: LABOR ANALGESIA Diagnosis:     Scheduled Providers:  Provider: Kirti Mckinney CRNA    Anesthesia Type: epidural ASA Status: 2            Anesthesia Type: epidural    Vitals  Vitals Value Taken Time   /69 07/17/24 0956   Temp 36.3 °C (97.3 °F) 07/17/24 0956   Pulse 84 07/17/24 0956   Resp 18 07/17/24 0956   SpO2 100 % 07/16/24 0649   Vitals shown include unfiled device data.        Post Anesthesia Care and Evaluation    Patient location during evaluation: bedside  Patient participation: complete - patient participated  Level of consciousness: awake  Pain score: 0  Pain management: adequate    Airway patency: patent  Anesthetic complications: No anesthetic complications  PONV Status: controlled  Cardiovascular status: acceptable and stable  Respiratory status: acceptable  Hydration status: acceptable  Post Neuraxial Block status: Motor and sensory function returned to baseline and No signs or symptoms of PDPH

## 2024-07-17 NOTE — OUTREACH NOTE
[facility]  IP Postpartum        [facility] Postpartum information sent via [employer].       Carmen Chu RN  Maternity Nurse Navigator    7/17/2024, 15:48 EDT

## 2024-07-25 ENCOUNTER — TELEPHONE (OUTPATIENT)
Dept: LACTATION | Facility: HOSPITAL | Age: 29
End: 2024-07-25
Payer: COMMERCIAL

## 2024-07-25 NOTE — TELEPHONE ENCOUNTER
ANTHONY called to check with this patient and her breastfeeding progress. Patient did not answer the phone so  provided the lactation dept number and encouraged to call for any lactation needs.

## 2024-08-06 ENCOUNTER — PATIENT OUTREACH (OUTPATIENT)
Dept: LABOR AND DELIVERY | Facility: HOSPITAL | Age: 29
End: 2024-08-06
Payer: COMMERCIAL

## 2024-08-06 NOTE — OUTREACH NOTE
[facility]  Unable to Reach       Questions/Answers      Flowsheet Row Responses   Pending Outreach Postpartum Check-in   Call Attempt First   Outcome No answer/busy, Left message   Next Call Attempt Date 08/07/24              Carmen Chu RN  Maternity Nurse Navigator    8/6/2024, 14:10 EDT

## 2024-08-08 ENCOUNTER — PATIENT OUTREACH (OUTPATIENT)
Dept: LABOR AND DELIVERY | Facility: HOSPITAL | Age: 29
End: 2024-08-08
Payer: COMMERCIAL

## 2024-08-08 NOTE — OUTREACH NOTE
[facility]  Postpartum Check-In    Questions/Answers      Flowsheet Row Responses   Visit Setting Telephone   Best Method for Contacting Cell   OB Discharge Note Reviewed  Reviewed   OB Discharge Navigator Reviewed  Reviewed   OB Discharge Medications Reviewed  Reviewed    discharged home with mother? Yes   Current Pain Levels 0-10 0   At Rest Pain Levels 0-10 0   Pain level with activity 0-10 0   Acceptable Pain Level 0-10 0   Verbalized Emotional State Acceptance   Family/Support Network Family, Significant Other   Level of Involvement in Care Attentive, Interactive, Supportive   Do you feel comfortable in your relationship with your baby? Yes   Have members of your household adjusted to your baby? Yes   Is the baby's father supportive and/or involved with the baby? Yes   How does your partner feel about the baby? Happy, Involved   Do you feel safe at home, school and work? Yes   Are you in a relationship with someone who threatens you or hurts you? No   Do you have the resources to keep yourself and your baby healthy and safe? Yes   Lochia (per patient report) None noted   Amount None   Lochia Odor None   Is patient breastfeeding? Yes, pumping   Frequency Q2-3H   Pumping Quantity 28-40oz/day   Postpartum Depression Screening Education Education Provided   Doctor Appointments: Education Provided   Breastfeeding Education Education Provided   Family Planning Education Education Provided   Postpartum Care Education Education Provided   S & S to report Education Provided   Followup Appointments Made Yes   Well Child Visit Appointments Made Yes   Did you complete the visit? Yes   Were there any specific concerns? No   Umbilical Cord No reported signs or symptoms   Infant Feeding Method Expressed Breast Milk   Expressed milk PO (mL) 2-4oz   Expressed milk- frequency of feedings Q1.5-2.5h   Number of wet diapers x 24 hours 13   Last BM x 24 hours 7   What safe sleep surface is available? Scarlett Underwood  there stuffed animals, toys, pillows, quilts, blankets, wedges, positioners, bumpers or other loose bedding in the infant's sleeping environment? No   Where does the baby usually sleep? Bassinet   Does the baby ever share a sleep surface with a sibling, adult or pet? No   Does the baby ever share a sleep surface in a bed, couch, recliner or other? No   What position do you place your baby to sleep for naps? Back   What position do you place your baby to sleep at night Back   Are you and/or other caregivers smoking inside or outside the baby's home? No   Is the infant dressed appropiately for the temperature of the home? Yes   Do you use a clean, dry pacifier that is not attached to a string or stuffed animal? Yes            Review of Systems   All other systems reviewed and are negative.    Most Recent [place]  Depression Scale Score (EPDS)    Performed by a clinician: 0 (2024  2:43 PM)    Doing well. No current questions, needs or concerns.     5 Ps Screen  complete      Carmen Chu RN  Maternity Nurse Navigator    2024, 14:44 EDT

## 2024-08-15 ENCOUNTER — PATIENT OUTREACH (OUTPATIENT)
Dept: CALL CENTER | Facility: HOSPITAL | Age: 29
End: 2024-08-15
Payer: COMMERCIAL

## 2024-08-15 NOTE — OUTREACH NOTE
[facility] Survey      Flowsheet Row Responses   [facility] facility patient discharged from? [facility]   Week 1 attempt successful? No   Unsuccessful attempts Attempt 2   Reschedule Tomorrow              Alana GONZALEZ - Registered Nurse

## 2024-08-15 NOTE — OUTREACH NOTE
Motherhood Connection Survey      Flowsheet Row Responses   [facility] facility patient discharged from? [facility]   Week 1 attempt successful? No   Unsuccessful attempts Attempt 1   Reschedule Today              Alana GONZALEZ - Registered Nurse

## 2024-08-16 ENCOUNTER — PATIENT OUTREACH (OUTPATIENT)
Dept: CALL CENTER | Facility: HOSPITAL | Age: 29
End: 2024-08-16
Payer: COMMERCIAL

## 2024-08-16 NOTE — OUTREACH NOTE
Motherhood Connection Survey      Flowsheet Row Responses   [facility] [place] patient discharged from? [facility]   Week 1 attempt successful? Yes   Call end time [id number]   Baby sex Girl    discharged home with mother? Yes   Baby sex Girl   Delivery type Vaginal   Emotional state Acceptance   Family support Yes   Do you have all necessary resources to care for you and your baby?  Yes   Have members of your household adjusted to your baby? Yes   Did you have any problems with pre-eclampsia during this pregnancy? Resolved   Did you have blood glucose issues during this pregnancy No   Lochia amount None   Did you have an episiotomy/tear/abdominal incision? Yes   Feeding Method Breast   Pumping Yes   Supplementing Breast Milk   Breast Condition No   Nipple Condition No   Signs baby is ready to eat Crying, Rooting   Feeding tolerance Wet/dirty diapers, Weight gain   Number of wet diapers x 24 hours 8-10   Last BM x 24 hours 3-4   Umbilical Cord No reported signs or symptoms   Where does the baby usually sleep? Bassinet   Are there stuffed animals, toys, pillows, quilts, blankets, wedges, positioners, bumpers or other loose bedding in the infant's sleeping environment? No   Does the baby ever share a sleep surface in a bed, couch, recliner or other? No   What position do you lay your baby down to sleep? Back   Are you and/or other caregivers smoking inside or outside the baby's home? No   Mom appointment comments: Mother has her appointment scheduled for    Baby appointment comments: Baby has seen the pediatrician   Call completed? Yes   How satisfied were you with the Motherhood Connection Program? 5              Ciara GONZALEZ - Licensed Nurse

## 2024-08-23 ENCOUNTER — POSTPARTUM VISIT (OUTPATIENT)
Dept: OBSTETRICS AND GYNECOLOGY | Facility: CLINIC | Age: 29
End: 2024-08-23
Payer: COMMERCIAL

## 2024-08-23 VITALS
HEART RATE: 77 BPM | HEIGHT: 65 IN | DIASTOLIC BLOOD PRESSURE: 85 MMHG | BODY MASS INDEX: 27.82 KG/M2 | WEIGHT: 167 LBS | SYSTOLIC BLOOD PRESSURE: 122 MMHG

## 2024-08-23 DIAGNOSIS — Z87.42 HISTORY OF ABNORMAL CERVICAL PAP SMEAR: ICD-10-CM

## 2024-08-23 DIAGNOSIS — N89.8 VAGINAL DISCHARGE: ICD-10-CM

## 2024-08-23 DIAGNOSIS — Z75.8 DOES NOT HAVE PRIMARY CARE PROVIDER: ICD-10-CM

## 2024-08-23 PROBLEM — O26.899 RH NEGATIVE, ANTEPARTUM: Status: RESOLVED | Noted: 2024-02-01 | Resolved: 2024-08-23

## 2024-08-23 PROBLEM — Z67.91 RH NEGATIVE, ANTEPARTUM: Status: RESOLVED | Noted: 2024-02-01 | Resolved: 2024-08-23

## 2024-08-23 PROBLEM — O16.3 ELEVATED BLOOD PRESSURE AFFECTING PREGNANCY IN THIRD TRIMESTER, ANTEPARTUM: Status: RESOLVED | Noted: 2024-05-23 | Resolved: 2024-08-23

## 2024-08-23 PROBLEM — O98.513 COVID-19 AFFECTING PREGNANCY IN THIRD TRIMESTER: Status: RESOLVED | Noted: 2024-03-13 | Resolved: 2024-08-23

## 2024-08-23 PROBLEM — Z34.00 SUPERVISION OF NORMAL FIRST PREGNANCY, ANTEPARTUM: Status: RESOLVED | Noted: 2024-01-31 | Resolved: 2024-08-23

## 2024-08-23 PROBLEM — U07.1 COVID-19 AFFECTING PREGNANCY IN THIRD TRIMESTER: Status: RESOLVED | Noted: 2024-03-13 | Resolved: 2024-08-23

## 2024-08-23 LAB
CANDIDA SPECIES: NEGATIVE
GARDNERELLA VAGINALIS: POSITIVE
T VAGINALIS DNA VAG QL PROBE+SIG AMP: NEGATIVE

## 2024-08-23 PROCEDURE — 87660 TRICHOMONAS VAGIN DIR PROBE: CPT | Performed by: STUDENT IN AN ORGANIZED HEALTH CARE EDUCATION/TRAINING PROGRAM

## 2024-08-23 PROCEDURE — 87510 GARDNER VAG DNA DIR PROBE: CPT | Performed by: STUDENT IN AN ORGANIZED HEALTH CARE EDUCATION/TRAINING PROGRAM

## 2024-08-23 PROCEDURE — 87480 CANDIDA DNA DIR PROBE: CPT | Performed by: STUDENT IN AN ORGANIZED HEALTH CARE EDUCATION/TRAINING PROGRAM

## 2024-08-23 PROCEDURE — 99214 OFFICE O/P EST MOD 30 MIN: CPT | Performed by: STUDENT IN AN ORGANIZED HEALTH CARE EDUCATION/TRAINING PROGRAM

## 2024-08-23 NOTE — PROGRESS NOTES
"POSTPARTUM Follow Up Visit    CC:  Postpartum     HPI:  Juanito Newberry is a 29 y.o.  status post normal spontaneous vaginal delivery on 2024.      Antepartum or Postpartum complications:   Patient Active Problem List   Diagnosis    Supervision of normal first pregnancy, antepartum    Rh negative, antepartum    COVID-19 affecting pregnancy in third trimester    Elevated blood pressure affecting pregnancy in third trimester, antepartum     (normal spontaneous vaginal delivery)       Delivery type:    Perineum : 1st degree laceration  Feeding: Pumping    Pain:  No  Vaginal Bleeding:  reports spotting 2 days ago. No real bleeding since second week. No sex since delivery. Reports yellowish discharge with odor, no irritation   EPDS score: 0 (2024  1:04 PM)  No SI or HI. Getting good support at home.   Plans for BC:  Abstinence  Last PAP: ASCUS/HR HPV +   Last Completed Pap Smear            PAP SMEAR (Every 3 Years) Next due on 2024  IGP,CtNgTv,Apt HPV,rfx 16 / 18,45                    /85   Pulse 77   Ht 165.1 cm (65\")   Wt 75.8 kg (167 lb)   LMP  (LMP Unknown) Comment: pt. think her last period was about 2 mo. hasn't been sexually active for 2 mo. doesn't know if she could be pregnant because she hasn't checked  Breastfeeding Yes   BMI 27.79 kg/m²     Physical Exam  Vitals and nursing note reviewed. Exam conducted with a chaperone present.   Constitutional:       General: She is not in acute distress.     Appearance: Normal appearance. She is not toxic-appearing.   HENT:      Head: Normocephalic and atraumatic.   Eyes:      Extraocular Movements: Extraocular movements intact.      Conjunctiva/sclera: Conjunctivae normal.   Cardiovascular:      Pulses: Normal pulses.   Pulmonary:      Effort: Pulmonary effort is normal.   Abdominal:      General: There is no distension.      Palpations: Abdomen is soft.      Tenderness: There is no abdominal tenderness. "   Genitourinary:     General: Normal vulva.      Exam position: Lithotomy position.      Labia:         Right: No tenderness, lesion or injury.         Left: No tenderness, lesion or injury.       Vagina: Normal.      Cervix: Normal.      Uterus: Normal.       Adnexa: Right adnexa normal and left adnexa normal.   Musculoskeletal:      Cervical back: Normal range of motion.   Skin:     General: Skin is warm and dry.   Neurological:      Mental Status: She is alert and oriented to person, place, and time.   Psychiatric:         Mood and Affect: Mood normal.         Behavior: Behavior normal.         Thought Content: Thought content normal.           ASSESSMENT AND PLAN:  Juanito Newberry is a 29 y.o.  presenting for postpartum evaluation.  Patient now postpartum day number 38.  Patient has experienced a weight loss of 15# since delivery.  Patient desires abstinence for contraception.   Recommendation for evaluation with primary care physician within 3 to 6 months from delivery.  Return to office in February for annual examination and follow-up Pap smear for history of abnormality.  Vaginitis panel performed patient's per quest for vaginal discharge with odor.  No allergies to antibiotics if treatment necessary.    Diagnoses and all orders for this visit:    1. Postpartum follow-up (Primary)    2. History of abnormal cervical Pap smear    3. Does not have primary care provider  -     Ambulatory Referral to Family Practice    4. Vaginal discharge  -     Gardnerella vaginalis, Trichomonas vaginalis, Candida albicans, DNA - Swab, Vagina        Counseling:  Ok to resume intercourse  May resume intercourse once 4 weeks postpartum  May resume normal activities  Core strengthening exercises reviewed and recommended  Kegel exercises reviewed and recommended  Ok to return to work/school once patient desires/maternity leave completed    Referral/Consult: Family medicine    Follow Up:  Return in about 5 months (around  2/5/2025) for Annual physical.          Hollis Thomson MD  08/23/2024

## 2024-08-26 RX ORDER — METRONIDAZOLE 500 MG/1
500 TABLET ORAL 2 TIMES DAILY
Qty: 14 TABLET | Refills: 0 | Status: SHIPPED | OUTPATIENT
Start: 2024-08-26 | End: 2024-09-02

## 2024-09-16 ENCOUNTER — TELEPHONE (OUTPATIENT)
Dept: OBSTETRICS AND GYNECOLOGY | Facility: CLINIC | Age: 29
End: 2024-09-16

## 2024-09-16 NOTE — TELEPHONE ENCOUNTER
Provider: DR PEOPLES    Caller: MARIANELA ACOSTA    Relationship to Patient: SELF    Phone Number: [phone]    Reason for Call: PT CALLED STATED SHE RETURNED TO WORK; PT WORK SAID THEY NEED A LETTER STATING IT IS OKAY FOR HER TO RETURN TO WORK - FIT FOR WORK LETTER. PT STATED WE CAN SEND IT TO HER [employer].    PT NEEDS THIS ASAP; PLEASE SEND TO MYCHART OR CALL PT TO ADVISE.